# Patient Record
Sex: MALE | Race: WHITE | Employment: UNEMPLOYED | URBAN - METROPOLITAN AREA
[De-identification: names, ages, dates, MRNs, and addresses within clinical notes are randomized per-mention and may not be internally consistent; named-entity substitution may affect disease eponyms.]

---

## 2022-07-12 ENCOUNTER — APPOINTMENT (OUTPATIENT)
Dept: GENERAL RADIOLOGY | Age: 47
End: 2022-07-12
Payer: COMMERCIAL

## 2022-07-12 ENCOUNTER — HOSPITAL ENCOUNTER (OUTPATIENT)
Age: 47
Setting detail: OBSERVATION
Discharge: HOME | End: 2022-07-15
Attending: EMERGENCY MEDICINE | Admitting: INTERNAL MEDICINE
Payer: COMMERCIAL

## 2022-07-12 DIAGNOSIS — M25.461 EFFUSION, RIGHT KNEE: ICD-10-CM

## 2022-07-12 DIAGNOSIS — R60.9 PERIPHERAL EDEMA: ICD-10-CM

## 2022-07-12 DIAGNOSIS — M25.461 EFFUSION OF RIGHT KNEE: Primary | ICD-10-CM

## 2022-07-12 LAB
ALBUMIN SERPL-MCNC: 3.2 G/DL (ref 3.5–4.6)
ALP BLD-CCNC: 133 U/L (ref 35–104)
ALT SERPL-CCNC: 91 U/L (ref 0–41)
ANION GAP SERPL CALCULATED.3IONS-SCNC: 12 MEQ/L (ref 9–15)
AST SERPL-CCNC: 67 U/L (ref 0–40)
BASOPHILS ABSOLUTE: 0 K/UL (ref 0–0.1)
BASOPHILS RELATIVE PERCENT: 0.6 % (ref 0.2–1.2)
BILIRUB SERPL-MCNC: 0.6 MG/DL (ref 0.2–0.7)
BUN BLDV-MCNC: 11 MG/DL (ref 6–20)
CALCIUM SERPL-MCNC: 9.4 MG/DL (ref 8.5–9.9)
CHLORIDE BLD-SCNC: 101 MEQ/L (ref 95–107)
CO2: 22 MEQ/L (ref 20–31)
CREAT SERPL-MCNC: 0.78 MG/DL (ref 0.7–1.2)
EOSINOPHILS ABSOLUTE: 0.4 K/UL (ref 0–0.5)
EOSINOPHILS RELATIVE PERCENT: 6.1 % (ref 0.8–7)
GFR AFRICAN AMERICAN: >60
GFR NON-AFRICAN AMERICAN: >60
GLOBULIN: 6.2 G/DL (ref 2.3–3.5)
GLUCOSE BLD-MCNC: 94 MG/DL (ref 70–99)
HCT VFR BLD CALC: 40.3 % (ref 42–52)
HEMOGLOBIN: 13 G/DL (ref 13.7–17.5)
IMMATURE GRANULOCYTES #: 0 K/UL
IMMATURE GRANULOCYTES %: 0.2 %
LV EF: 60 %
LVEF MODALITY: NORMAL
LYMPHOCYTES ABSOLUTE: 2.4 K/UL (ref 1.3–3.6)
LYMPHOCYTES RELATIVE PERCENT: 37.1 %
MCH RBC QN AUTO: 30.7 PG (ref 25.7–32.2)
MCHC RBC AUTO-ENTMCNC: 32.3 % (ref 32.3–36.5)
MCV RBC AUTO: 95.3 FL (ref 79–92.2)
MONOCYTES ABSOLUTE: 0.5 K/UL (ref 0.3–0.8)
MONOCYTES RELATIVE PERCENT: 8.4 % (ref 5.3–12.2)
NEUTROPHILS ABSOLUTE: 3.1 K/UL (ref 1.8–5.4)
NEUTROPHILS RELATIVE PERCENT: 47.6 % (ref 34–67.9)
PDW BLD-RTO: 13.5 % (ref 11.6–14.4)
PLATELET # BLD: 247 K/UL (ref 163–337)
POTASSIUM SERPL-SCNC: 4 MEQ/L (ref 3.4–4.9)
PRO-BNP: 143 PG/ML
RBC # BLD: 4.23 M/UL (ref 4.63–6.08)
SODIUM BLD-SCNC: 135 MEQ/L (ref 135–144)
TOTAL PROTEIN: 9.4 G/DL (ref 6.3–8)
TSH SERPL DL<=0.05 MIU/L-ACNC: 1.91 UIU/ML (ref 0.44–3.86)
URIC ACID, SERUM: 6.6 MG/DL (ref 3.4–7)
WBC # BLD: 6.4 K/UL (ref 4.2–9)

## 2022-07-12 PROCEDURE — 2580000003 HC RX 258: Performed by: INTERNAL MEDICINE

## 2022-07-12 PROCEDURE — 99251 PR INITL INPATIENT CONSULT NEW/ESTAB PT 20 MIN: CPT | Performed by: ORTHOPAEDIC SURGERY

## 2022-07-12 PROCEDURE — 83880 ASSAY OF NATRIURETIC PEPTIDE: CPT

## 2022-07-12 PROCEDURE — 6370000000 HC RX 637 (ALT 250 FOR IP): Performed by: INTERNAL MEDICINE

## 2022-07-12 PROCEDURE — G0378 HOSPITAL OBSERVATION PER HR: HCPCS

## 2022-07-12 PROCEDURE — 85025 COMPLETE CBC W/AUTO DIFF WBC: CPT

## 2022-07-12 PROCEDURE — 6370000000 HC RX 637 (ALT 250 FOR IP): Performed by: ORTHOPAEDIC SURGERY

## 2022-07-12 PROCEDURE — 73562 X-RAY EXAM OF KNEE 3: CPT

## 2022-07-12 PROCEDURE — 84550 ASSAY OF BLOOD/URIC ACID: CPT

## 2022-07-12 PROCEDURE — 99285 EMERGENCY DEPT VISIT HI MDM: CPT

## 2022-07-12 PROCEDURE — C8929 TTE W OR WO FOL WCON,DOPPLER: HCPCS

## 2022-07-12 PROCEDURE — 96374 THER/PROPH/DIAG INJ IV PUSH: CPT

## 2022-07-12 PROCEDURE — 20610 DRAIN/INJ JOINT/BURSA W/O US: CPT

## 2022-07-12 PROCEDURE — 96375 TX/PRO/DX INJ NEW DRUG ADDON: CPT

## 2022-07-12 PROCEDURE — 80053 COMPREHEN METABOLIC PANEL: CPT

## 2022-07-12 PROCEDURE — 6360000004 HC RX CONTRAST MEDICATION: Performed by: INTERNAL MEDICINE

## 2022-07-12 PROCEDURE — 6360000002 HC RX W HCPCS: Performed by: INTERNAL MEDICINE

## 2022-07-12 PROCEDURE — 6360000002 HC RX W HCPCS: Performed by: EMERGENCY MEDICINE

## 2022-07-12 PROCEDURE — 84443 ASSAY THYROID STIM HORMONE: CPT

## 2022-07-12 PROCEDURE — 1210000000 HC MED SURG R&B

## 2022-07-12 PROCEDURE — 36415 COLL VENOUS BLD VENIPUNCTURE: CPT

## 2022-07-12 RX ORDER — SODIUM CHLORIDE 0.9 % (FLUSH) 0.9 %
5-40 SYRINGE (ML) INJECTION EVERY 12 HOURS SCHEDULED
Status: DISCONTINUED | OUTPATIENT
Start: 2022-07-12 | End: 2022-07-15 | Stop reason: HOSPADM

## 2022-07-12 RX ORDER — FUROSEMIDE 10 MG/ML
40 INJECTION INTRAMUSCULAR; INTRAVENOUS 2 TIMES DAILY
Status: DISCONTINUED | OUTPATIENT
Start: 2022-07-12 | End: 2022-07-13

## 2022-07-12 RX ORDER — COLCHICINE 0.6 MG/1
1.2 TABLET ORAL ONCE
Status: DISCONTINUED | OUTPATIENT
Start: 2022-07-12 | End: 2022-07-15 | Stop reason: HOSPADM

## 2022-07-12 RX ORDER — POLYETHYLENE GLYCOL 3350 17 G/17G
17 POWDER, FOR SOLUTION ORAL DAILY PRN
Status: DISCONTINUED | OUTPATIENT
Start: 2022-07-12 | End: 2022-07-15 | Stop reason: HOSPADM

## 2022-07-12 RX ORDER — MELOXICAM 7.5 MG/1
7.5 TABLET ORAL DAILY
Status: DISCONTINUED | OUTPATIENT
Start: 2022-07-12 | End: 2022-07-15 | Stop reason: HOSPADM

## 2022-07-12 RX ORDER — OXYCODONE HYDROCHLORIDE AND ACETAMINOPHEN 5; 325 MG/1; MG/1
1 TABLET ORAL EVERY 4 HOURS PRN
Status: DISCONTINUED | OUTPATIENT
Start: 2022-07-12 | End: 2022-07-15 | Stop reason: HOSPADM

## 2022-07-12 RX ORDER — ONDANSETRON 2 MG/ML
4 INJECTION INTRAMUSCULAR; INTRAVENOUS EVERY 6 HOURS PRN
Status: DISCONTINUED | OUTPATIENT
Start: 2022-07-12 | End: 2022-07-15 | Stop reason: HOSPADM

## 2022-07-12 RX ORDER — KETOROLAC TROMETHAMINE 15 MG/ML
15 INJECTION, SOLUTION INTRAMUSCULAR; INTRAVENOUS ONCE
Status: COMPLETED | OUTPATIENT
Start: 2022-07-12 | End: 2022-07-12

## 2022-07-12 RX ORDER — SODIUM CHLORIDE 0.9 % (FLUSH) 0.9 %
5-40 SYRINGE (ML) INJECTION PRN
Status: DISCONTINUED | OUTPATIENT
Start: 2022-07-12 | End: 2022-07-15 | Stop reason: HOSPADM

## 2022-07-12 RX ORDER — MELOXICAM 7.5 MG/1
7.5 TABLET ORAL DAILY
Qty: 30 TABLET | Refills: 5 | Status: SHIPPED | OUTPATIENT
Start: 2022-07-12

## 2022-07-12 RX ORDER — CITALOPRAM 10 MG/1
10 TABLET ORAL DAILY
COMMUNITY

## 2022-07-12 RX ORDER — BICTEGRAVIR SODIUM, EMTRICITABINE, AND TENOFOVIR ALAFENAMIDE FUMARATE 50; 200; 25 MG/1; MG/1; MG/1
1 TABLET ORAL DAILY
COMMUNITY

## 2022-07-12 RX ORDER — ENOXAPARIN SODIUM 100 MG/ML
30 INJECTION SUBCUTANEOUS 2 TIMES DAILY
Status: DISCONTINUED | OUTPATIENT
Start: 2022-07-12 | End: 2022-07-12

## 2022-07-12 RX ORDER — LORAZEPAM 0.5 MG/1
0.5 TABLET ORAL EVERY 6 HOURS PRN
COMMUNITY

## 2022-07-12 RX ORDER — ONDANSETRON 4 MG/1
4 TABLET, ORALLY DISINTEGRATING ORAL EVERY 8 HOURS PRN
Status: DISCONTINUED | OUTPATIENT
Start: 2022-07-12 | End: 2022-07-15 | Stop reason: HOSPADM

## 2022-07-12 RX ORDER — SODIUM CHLORIDE 9 MG/ML
INJECTION, SOLUTION INTRAVENOUS PRN
Status: DISCONTINUED | OUTPATIENT
Start: 2022-07-12 | End: 2022-07-15 | Stop reason: HOSPADM

## 2022-07-12 RX ORDER — ACETAMINOPHEN 650 MG/1
650 SUPPOSITORY RECTAL EVERY 6 HOURS PRN
Status: DISCONTINUED | OUTPATIENT
Start: 2022-07-12 | End: 2022-07-15 | Stop reason: HOSPADM

## 2022-07-12 RX ORDER — ACETAMINOPHEN 325 MG/1
650 TABLET ORAL EVERY 6 HOURS PRN
Status: DISCONTINUED | OUTPATIENT
Start: 2022-07-12 | End: 2022-07-15 | Stop reason: HOSPADM

## 2022-07-12 RX ADMIN — PERFLUTREN 1.65 MG: 6.52 INJECTION, SUSPENSION INTRAVENOUS at 18:08

## 2022-07-12 RX ADMIN — OXYCODONE AND ACETAMINOPHEN 1 TABLET: 325; 5 TABLET ORAL at 21:55

## 2022-07-12 RX ADMIN — MELOXICAM 7.5 MG: 7.5 TABLET ORAL at 18:54

## 2022-07-12 RX ADMIN — FUROSEMIDE 40 MG: 10 INJECTION, SOLUTION INTRAMUSCULAR; INTRAVENOUS at 17:29

## 2022-07-12 RX ADMIN — SODIUM CHLORIDE, PRESERVATIVE FREE 10 ML: 5 INJECTION INTRAVENOUS at 21:58

## 2022-07-12 RX ADMIN — KETOROLAC TROMETHAMINE 15 MG: 15 INJECTION, SOLUTION INTRAMUSCULAR; INTRAVENOUS at 10:38

## 2022-07-12 ASSESSMENT — PAIN SCALES - GENERAL
PAINLEVEL_OUTOF10: 2
PAINLEVEL_OUTOF10: 2
PAINLEVEL_OUTOF10: 7
PAINLEVEL_OUTOF10: 6
PAINLEVEL_OUTOF10: 6

## 2022-07-12 ASSESSMENT — PAIN DESCRIPTION - LOCATION
LOCATION: FOOT
LOCATION: FOOT;KNEE
LOCATION: FOOT;KNEE
LOCATION: FOOT

## 2022-07-12 ASSESSMENT — PAIN DESCRIPTION - ORIENTATION
ORIENTATION: LEFT;RIGHT
ORIENTATION: RIGHT;LEFT
ORIENTATION: RIGHT;LEFT;ANTERIOR
ORIENTATION: LEFT;RIGHT

## 2022-07-12 ASSESSMENT — PAIN DESCRIPTION - PAIN TYPE
TYPE: ACUTE PAIN

## 2022-07-12 ASSESSMENT — PAIN - FUNCTIONAL ASSESSMENT: PAIN_FUNCTIONAL_ASSESSMENT: 0-10

## 2022-07-12 ASSESSMENT — PAIN DESCRIPTION - FREQUENCY
FREQUENCY: INTERMITTENT
FREQUENCY: INTERMITTENT

## 2022-07-12 ASSESSMENT — PAIN DESCRIPTION - DESCRIPTORS
DESCRIPTORS: THROBBING
DESCRIPTORS: ACHING;TENDER
DESCRIPTORS: BURNING;THROBBING
DESCRIPTORS: ACHING

## 2022-07-12 NOTE — PLAN OF CARE
Problem: Discharge Planning  Goal: Discharge to home or other facility with appropriate resources  Outcome: Progressing  Flowsheets (Taken 7/12/2022 4260)  Discharge to home or other facility with appropriate resources:   Identify barriers to discharge with patient and caregiver   Identify discharge learning needs (meds, wound care, etc)

## 2022-07-12 NOTE — DISCHARGE INSTRUCTIONS
ORTHOPEDIC SURGERY DISCHARGE INSTRUCTIONS:    Orthopedic Consultant: Heidi Ledezma MD    Principal Diagnosis: Right knee effusion and synovitis    Activity:  -Maintain compressive wrap on right knee with 6 inch Ace bandage. Remove dressing for hygiene purposes and subsequently rewrap. -Right knee immobilizer to be worn at all times while standing or walking. May use walker or cane for assistance as necessary. Right knee immobilizer may be removed while seated or lying in bed, as well as for application of ice packs.  -No pivoting on right foot/ankle, such as for transfers from bed to chair.  -May place full weight as tolerated on right foot and ankle, while wearing the immobilizer. Medications:  -Continue colchicine as per medicine reconciliation form.  -A prescription for meloxicam (Mobic) 7.5 mg tablets has been sent to your pharmacy. Please take 1 tablet by mouth with food on a regular daily schedule. This is an anti-inflammatory medication. Additional Instructions:  -May apply ice packs to right knee for 15-20 minutes every 4 hours as needed to control inflammation/swelling. Remove knee immobilizer for purposes of icing. Follow-up:  -Will need to be seen by Dr. Heidi Ledezma in office ([address]) on 7/26 for reassessment of right knee inflammation. Please call [phone] within 2-3 days following discharge from the hospital to schedule this appointment.     Heidi Ledezma MD  Orthopedic Surgery, Specialist in Hip and Knee Reconstruction and Revision    [address]  [address]  [address], [address] [facility]

## 2022-07-12 NOTE — ED NOTES
Dr. Carey Bucio accepted patient for admission after Dr. Adan Simon agreed to C/S. [place] super aware patient reeady to be admitted upstairs.       Cindy Walker RN  07/12/22 1386

## 2022-07-12 NOTE — PROGRESS NOTES
Patient brought up to room 208 via wheelchair, pt was already in wheelchair when I went to get him from the ER. Patient used both hand rails in bathroom to transfer to commode and back to chair after toileting. CGA. Patient required help with lower dressing. Patient to bed via stand and pivot with leaning on bed to transfer self with CGA to bed. Patient did scoot self up in bed into position of comfort. Dr Rk Bazzi informed that ortho, Dr Mary Hurd, was contacted in ER and will see the patient today after clinic hours. Echo tech notified for Echo order.

## 2022-07-12 NOTE — ED TRIAGE NOTES
Patient presents to ED via EMS with reports of bilat feet swelling and discomfort over the past week and increased right knee pain despite no injury.  He lives at home with his dad and reports it is becoming difficult to ambulate

## 2022-07-12 NOTE — ED PROVIDER NOTES
[facility]"" ED  EMERGENCY DEPARTMENT ENCOUNTER      Pt Name: Lona Phoenix  MRN: [id number]  [place] 1975  Date of evaluation: 7/12/2022  Provider: Melissa Delvalle MD    [address] [place] [address]       Chief Complaint   Patient presents with    Leg Swelling     bilat feet swelling for the past week    Knee Pain     right side - denies any injury         HISTORY OF PRESENT ILLNESS   (Location/Symptom, Timing/Onset, Context/Setting, Quality, Duration, Modifying Factors, Severity)  Note limiting factors. 65-year-old male presenting with bilateral foot swelling. Symptoms have been ongoing for couple of days. History of gout. He also notes swelling of his right knee. There is no redness or warmth. He denies having any fevers. No injury noted. No chest pain or shortness of breath. Nursing Notes were reviewed. REVIEW OF SYSTEMS    (2-9 systems for level 4, 10 or more for level 5)     Review of Systems   Cardiovascular: Positive for leg swelling. Except as noted above the remainder of the review of systems was reviewed and negative. PAST MEDICAL HISTORY     Past Medical History:   Diagnosis Date    Anxiety     Gout     HIV (human immunodeficiency virus infection) ([facility] [address]          SURGICAL HISTORY     History reviewed. No pertinent surgical history. CURRENT MEDICATIONS       Current Discharge Medication List      CONTINUE these medications which have NOT CHANGED    Details   citalopram (CELEXA) 10 MG tablet Take 10 mg by mouth daily      LORazepam (ATIVAN) 0.5 MG tablet Take 0.5 mg by mouth every 6 hours as needed for Anxiety. bictegravir-emtricitab-tenofovir alafenamide (BIKTARVY) -25 MG TABS per tablet Take 1 tablet by mouth daily             ALLERGIES     Bactrim [sulfamethoxazole-trimethoprim]    FAMILY HISTORY     History reviewed. No pertinent family history.        SOCIAL HISTORY       Social History     Socioeconomic History    Marital status: Single     Spouse name: None    Number of children: None    Years of education: None    Highest education level: None   Occupational History    None   Tobacco Use    Smoking status: Never Smoker    Smokeless tobacco: Never Used   Substance and Sexual Activity    Alcohol use: Never    Drug use: Never    Sexual activity: Not Currently   Other Topics Concern    None   Social History Narrative    None     Social Determinants of Health     Financial Resource Strain:     Difficulty of Paying Living Expenses: Not on file   Food Insecurity:     Worried About Running Out of Food in the Last Year: Not on file    Sorin of Food in the Last Year: Not on file   Transportation Needs:     Lack of Transportation (Medical): Not on file    Lack of Transportation (Non-Medical):  Not on file   Physical Activity:     Days of Exercise per Week: Not on file    Minutes of Exercise per Session: Not on file   Stress:     Feeling of Stress : Not on file   Social Connections:     Frequency of Communication with Friends and Family: Not on file    Frequency of Social Gatherings with Friends and Family: Not on file    Attends Restorationist Services: Not on file    Active Member of [employer] or Organizations: Not on file    Attends Club or Organization Meetings: Not on file    Marital Status: Not on file   Intimate Partner Violence:     Fear of Current or Ex-Partner: Not on file    Emotionally Abused: Not on file    Physically Abused: Not on file    Sexually Abused: Not on file   Housing Stability:     Unable to Pay for Housing in the Last Year: Not on file    Number of [place] in the Last Year: Not on file    Unstable Housing in the Last Year: Not on file       SCREENINGS    Kalli Coma Scale  Eye Opening: Spontaneous  Best Verbal Response: Oriented  Best Motor Response: Obeys commands  [place] Coma Scale Score: 15          PHYSICAL EXAM    (up to 7 for level 4, 8 or more for level 5)     ED Triage Vitals [07/12/22 1027]   BP Temp Temp Source Heart Rate Resp SpO2 Height Weight   (!) 149/104 98 °F (36.7 °C) Oral 90 20 98 % 5' 5\" (1.651 m) 250 lb (113.4 kg)       Physical Exam  Vitals and nursing note reviewed. Constitutional:       General: He is not in acute distress. Appearance: Normal appearance. He is well-developed. He is obese. He is not ill-appearing. HENT:      Head: Normocephalic and atraumatic. Mouth/Throat:      Mouth: Mucous membranes are moist.      Pharynx: Oropharynx is clear. Eyes:      Extraocular Movements: Extraocular movements intact. Conjunctiva/sclera: Conjunctivae normal.   Cardiovascular:      Rate and Rhythm: Normal rate and regular rhythm. Pulmonary:      Effort: Pulmonary effort is normal.      Breath sounds: Normal breath sounds. Abdominal:      General: Bowel sounds are normal.      Palpations: Abdomen is soft. Tenderness: There is no abdominal tenderness. Musculoskeletal:         General: Swelling present. No deformity. Normal range of motion. Cervical back: Normal range of motion and neck supple. Comments: Swelling of b/l feet, R knee swollen; no redness or warmth, decreased ROM 2/2 pain   Skin:     General: Skin is warm and dry. Capillary Refill: Capillary refill takes less than 2 seconds. Neurological:      General: No focal deficit present. Mental Status: He is alert and oriented to person, place, and time. Mental status is at baseline. Cranial Nerves: No cranial nerve deficit. Psychiatric:         Thought Content:  Thought content normal.         DIAGNOSTIC RESULTS     EKG: All EKG's are interpreted by the Emergency Department Physician who either signs or Co-signs this chart in the absence of a cardiologist.    RADIOLOGY:   Non-plain film images such as CT, Ultrasound and MRI are read by the radiologist. Plain radiographic images are visualized and preliminarily interpreted by the emergency physician with the below findings:    Interpretation per the Radiologist below, if available at the time of this note:    XR KNEE RIGHT (3 VIEWS)   Final Result   NO BONY ABNORMALITY. LARGE EFFUSION. Naima Medeirosnathan LABS:  Labs Reviewed   COMPREHENSIVE METABOLIC PANEL - Abnormal; Notable for the following components:       Result Value    Total Protein 9.4 (*)     Albumin 3.2 (*)     Alkaline Phosphatase 133 (*)     ALT 91 (*)     AST 67 (*)     Globulin 6.2 (*)     All other components within normal limits   CBC WITH AUTO DIFFERENTIAL - Abnormal; Notable for the following components:    RBC 4.23 (*)     Hemoglobin 13.0 (*)     Hematocrit 40.3 (*)     MCV 95.3 (*)     All other components within normal limits       All other labs were within normal range or not returned as of this dictation. EMERGENCY DEPARTMENT COURSE and DIFFERENTIAL DIAGNOSIS/MDM:   Vitals:    Vitals:    07/12/22 1027   BP: (!) 149/104   Pulse: 90   Resp: 20   Temp: 98 °F (36.7 °C)   TempSrc: Oral   SpO2: 98%   Weight: 250 lb (113.4 kg)   Height: 5' 5\" (1.651 m)       MDM  Number of Diagnoses or Management Options  Effusion of right knee  Peripheral edema  Diagnosis management comments: Knee is tapped with significant fluid drained. Patient has improved range of motion afterwards. Recommended supportive care at home and leg elevation. He will need compression stockings. Attempted to discharge patient and upon putting his feet on the ground he screams out in pain. He is very dramatic. He does not attempt to even bear weight. His father is handicapped and cannot take care of him at home. I told him he should be able to take care of his self with a brace and crutches. Patient refusing to attempt this. At this point I called the hospitalist, Lucius Boeck. He accepts the admission I did speak to orthopedics at his request.  We will start on colchicine.         Arthrocentesis    Date/Time: 7/12/2022 11:31 AM  Performed by: Bret Sykes MD  Authorized by: Bret Sykes MD     Consent:     Consent obtained: Verbal    Consent given by:  Patient    Risks discussed:  Bleeding and infection    Alternatives discussed:  No treatment  Location:     Location:  Knee    Knee:  R knee  Anesthesia (see MAR for exact dosages): Anesthesia method:  Local infiltration    Local anesthetic:  Lidocaine 1% w/o epi  Procedure details:     Preparation: Patient was prepped and draped in usual sterile fashion      Needle gauge:  18 G    Ultrasound guidance: no      Approach:  Medial    Aspirate amount:  90cc    Aspirate characteristics:  Clear and yellow    Steroid injected: no      Specimen collected: no    Post-procedure details:     Dressing:  Adhesive bandage    Patient tolerance of procedure: Tolerated well, no immediate complications        CRITICAL CARE TIME   Total Critical Care time was 0 minutes, excluding separately reportable procedures. There was a high probability of clinically significant/life threatening deterioration in the patient's condition which required my urgent intervention. FINAL IMPRESSION      1. Effusion of right knee Stable   2.  Peripheral edema Stable         DISPOSITION/PLAN   DISPOSITION Decision To Discharge 07/12/2022 11:25:05 AM      (Please note that portions of this note were completed with a voice recognition program.  Efforts were made to edit the dictations but occasionally words are mis-transcribed.)    Joi Pinon MD (electronically signed)  Attending Emergency Physician        Joi Pinon MD  07/12/22 7313       Joi Pinon MD  07/12/22 1751

## 2022-07-12 NOTE — ED NOTES
Perfect serve sent to Dr. Cierra Robledo per Dr. Chris Rangel request.     John Avina RN  07/12/22 [phone]

## 2022-07-13 LAB
ANION GAP SERPL CALCULATED.3IONS-SCNC: 11 MEQ/L (ref 9–15)
BASOPHILS ABSOLUTE: 0 K/UL (ref 0–0.1)
BASOPHILS RELATIVE PERCENT: 0.7 % (ref 0.2–1.2)
BUN BLDV-MCNC: 14 MG/DL (ref 6–20)
CALCIUM SERPL-MCNC: 9.2 MG/DL (ref 8.5–9.9)
CHLORIDE BLD-SCNC: 102 MEQ/L (ref 95–107)
CO2: 25 MEQ/L (ref 20–31)
CREAT SERPL-MCNC: 0.94 MG/DL (ref 0.7–1.2)
EOSINOPHILS ABSOLUTE: 0.4 K/UL (ref 0–0.5)
EOSINOPHILS RELATIVE PERCENT: 8 % (ref 0.8–7)
GFR AFRICAN AMERICAN: >60
GFR NON-AFRICAN AMERICAN: >60
GLUCOSE BLD-MCNC: 96 MG/DL (ref 70–99)
HCT VFR BLD CALC: 37.9 % (ref 42–52)
HEMOGLOBIN: 12.2 G/DL (ref 13.7–17.5)
IMMATURE GRANULOCYTES #: 0 K/UL
IMMATURE GRANULOCYTES %: 0.2 %
LYMPHOCYTES ABSOLUTE: 1.7 K/UL (ref 1.3–3.6)
LYMPHOCYTES RELATIVE PERCENT: 31.6 %
MCH RBC QN AUTO: 30.7 PG (ref 25.7–32.2)
MCHC RBC AUTO-ENTMCNC: 32.2 % (ref 32.3–36.5)
MCV RBC AUTO: 95.2 FL (ref 79–92.2)
MONOCYTES ABSOLUTE: 0.6 K/UL (ref 0.3–0.8)
MONOCYTES RELATIVE PERCENT: 10.8 % (ref 5.3–12.2)
NEUTROPHILS ABSOLUTE: 2.7 K/UL (ref 1.8–5.4)
NEUTROPHILS RELATIVE PERCENT: 48.7 % (ref 34–67.9)
PDW BLD-RTO: 13.4 % (ref 11.6–14.4)
PLATELET # BLD: 224 K/UL (ref 163–337)
POTASSIUM REFLEX MAGNESIUM: 4.1 MEQ/L (ref 3.4–4.9)
PRO-BNP: 165 PG/ML
RBC # BLD: 3.98 M/UL (ref 4.63–6.08)
SODIUM BLD-SCNC: 138 MEQ/L (ref 135–144)
WBC # BLD: 5.5 K/UL (ref 4.2–9)

## 2022-07-13 PROCEDURE — 1210000000 HC MED SURG R&B

## 2022-07-13 PROCEDURE — 97116 GAIT TRAINING THERAPY: CPT

## 2022-07-13 PROCEDURE — 85025 COMPLETE CBC W/AUTO DIFF WBC: CPT

## 2022-07-13 PROCEDURE — 36415 COLL VENOUS BLD VENIPUNCTURE: CPT

## 2022-07-13 PROCEDURE — 6370000000 HC RX 637 (ALT 250 FOR IP): Performed by: ORTHOPAEDIC SURGERY

## 2022-07-13 PROCEDURE — 80048 BASIC METABOLIC PNL TOTAL CA: CPT

## 2022-07-13 PROCEDURE — 97162 PT EVAL MOD COMPLEX 30 MIN: CPT

## 2022-07-13 PROCEDURE — 2580000003 HC RX 258: Performed by: INTERNAL MEDICINE

## 2022-07-13 PROCEDURE — 97530 THERAPEUTIC ACTIVITIES: CPT

## 2022-07-13 PROCEDURE — 6370000000 HC RX 637 (ALT 250 FOR IP): Performed by: INTERNAL MEDICINE

## 2022-07-13 PROCEDURE — 83880 ASSAY OF NATRIURETIC PEPTIDE: CPT

## 2022-07-13 PROCEDURE — G0378 HOSPITAL OBSERVATION PER HR: HCPCS

## 2022-07-13 RX ORDER — PREDNISONE 20 MG/1
20 TABLET ORAL 2 TIMES DAILY
Status: DISCONTINUED | OUTPATIENT
Start: 2022-07-13 | End: 2022-07-15 | Stop reason: HOSPADM

## 2022-07-13 RX ORDER — COLCHICINE 0.6 MG/1
0.6 TABLET ORAL 2 TIMES DAILY
Status: DISCONTINUED | OUTPATIENT
Start: 2022-07-13 | End: 2022-07-15 | Stop reason: HOSPADM

## 2022-07-13 RX ORDER — FUROSEMIDE 20 MG/1
20 TABLET ORAL DAILY
Status: DISCONTINUED | OUTPATIENT
Start: 2022-07-13 | End: 2022-07-15 | Stop reason: HOSPADM

## 2022-07-13 RX ORDER — LORAZEPAM 0.5 MG/1
0.5 TABLET ORAL EVERY 6 HOURS PRN
Status: DISCONTINUED | OUTPATIENT
Start: 2022-07-13 | End: 2022-07-15 | Stop reason: HOSPADM

## 2022-07-13 RX ORDER — CITALOPRAM 20 MG/1
10 TABLET ORAL DAILY
Status: DISCONTINUED | OUTPATIENT
Start: 2022-07-13 | End: 2022-07-15 | Stop reason: HOSPADM

## 2022-07-13 RX ORDER — GABAPENTIN 100 MG/1
100 CAPSULE ORAL 3 TIMES DAILY
Status: DISCONTINUED | OUTPATIENT
Start: 2022-07-13 | End: 2022-07-15 | Stop reason: HOSPADM

## 2022-07-13 RX ADMIN — PREDNISONE 20 MG: 20 TABLET ORAL at 21:02

## 2022-07-13 RX ADMIN — GABAPENTIN 100 MG: 100 CAPSULE ORAL at 21:01

## 2022-07-13 RX ADMIN — COLCHICINE 0.6 MG: 0.6 TABLET ORAL at 08:13

## 2022-07-13 RX ADMIN — OXYCODONE AND ACETAMINOPHEN 1 TABLET: 325; 5 TABLET ORAL at 12:59

## 2022-07-13 RX ADMIN — MELOXICAM 7.5 MG: 7.5 TABLET ORAL at 08:08

## 2022-07-13 RX ADMIN — SODIUM CHLORIDE, PRESERVATIVE FREE 10 ML: 5 INJECTION INTRAVENOUS at 21:04

## 2022-07-13 RX ADMIN — OXYCODONE AND ACETAMINOPHEN 1 TABLET: 325; 5 TABLET ORAL at 08:08

## 2022-07-13 RX ADMIN — CITALOPRAM HYDROBROMIDE 10 MG: 20 TABLET ORAL at 08:15

## 2022-07-13 RX ADMIN — SODIUM CHLORIDE, PRESERVATIVE FREE 10 ML: 5 INJECTION INTRAVENOUS at 08:15

## 2022-07-13 RX ADMIN — FUROSEMIDE 20 MG: 20 TABLET ORAL at 08:14

## 2022-07-13 RX ADMIN — PREDNISONE 20 MG: 20 TABLET ORAL at 08:18

## 2022-07-13 RX ADMIN — GABAPENTIN 100 MG: 100 CAPSULE ORAL at 08:14

## 2022-07-13 RX ADMIN — COLCHICINE 0.6 MG: 0.6 TABLET ORAL at 21:01

## 2022-07-13 RX ADMIN — GABAPENTIN 100 MG: 100 CAPSULE ORAL at 12:59

## 2022-07-13 ASSESSMENT — PAIN DESCRIPTION - LOCATION
LOCATION: GENERALIZED;KNEE
LOCATION: LEG
LOCATION: GENERALIZED
LOCATION: KNEE;GENERALIZED

## 2022-07-13 ASSESSMENT — PAIN DESCRIPTION - ORIENTATION
ORIENTATION: RIGHT;LEFT
ORIENTATION: RIGHT
ORIENTATION: LEFT

## 2022-07-13 ASSESSMENT — PAIN SCALES - GENERAL
PAINLEVEL_OUTOF10: 2
PAINLEVEL_OUTOF10: 2
PAINLEVEL_OUTOF10: 7
PAINLEVEL_OUTOF10: 7
PAINLEVEL_OUTOF10: 4
PAINLEVEL_OUTOF10: 2
PAINLEVEL_OUTOF10: 3
PAINLEVEL_OUTOF10: 7

## 2022-07-13 ASSESSMENT — PAIN DESCRIPTION - DESCRIPTORS: DESCRIPTORS: ACHING

## 2022-07-13 ASSESSMENT — PAIN DESCRIPTION - FREQUENCY: FREQUENCY: INTERMITTENT

## 2022-07-13 ASSESSMENT — PAIN DESCRIPTION - PAIN TYPE: TYPE: ACUTE PAIN

## 2022-07-13 ASSESSMENT — PAIN DESCRIPTION - ONSET: ONSET: ON-GOING

## 2022-07-13 ASSESSMENT — PAIN - FUNCTIONAL ASSESSMENT: PAIN_FUNCTIONAL_ASSESSMENT: ACTIVITIES ARE NOT PREVENTED

## 2022-07-13 NOTE — H&P
Hospital Medicine History & Physical      PCP: Romana Menchaca    Date of Admission: 7/12/2022    Date of Service: 7/13/22      Chief Complaint:  Legs edema, feet/knee pain      History Of Present Illness:  49 y.o. male who presented to [facility] with above complains. About 2 weeks ago he noted L knee edema, L ankle and great toe pain, than edema/pain appeared in R knee/R ankle. His mobility markedly decreased due to intractable pain/edema. At this point he came to ER for further evaluation. Denied fever, trauma, dyspnea. In ER R nee aspiration performed and 90CC clear fluid were removed. After initial stabilization patient was admitted for further management to the floor. Past Medical History:          Diagnosis Date    Anxiety     Gout     HIV (human immunodeficiency virus infection) ([address]        Past Surgical History:      History reviewed. No pertinent surgical history. Medications Prior to Admission:      Prior to Admission medications    Medication Sig Start Date End Date Taking? Authorizing Provider   citalopram (CELEXA) 10 MG tablet Take 10 mg by mouth daily   Yes Historical Provider, MD   LORazepam (ATIVAN) 0.5 MG tablet Take 0.5 mg by mouth every 6 hours as needed for Anxiety. Yes Historical Provider, MD   bictegravir-emtricitab-tenofovir alafenamide (BIKTARVY) -25 MG TABS per tablet Take 1 tablet by mouth daily   Yes Historical Provider, MD   meloxicam (MOBIC) 7.5 MG tablet Take 1 tablet by mouth daily Regular daily schedule. 7/12/22  Yes Deo Melendrez MD       Allergies:  Bactrim [sulfamethoxazole-trimethoprim]    Social History:      The patient currently lives home    TOBACCO:   reports that he has never smoked. He has never used smokeless tobacco.  ETOH:   reports no history of alcohol use. Family History:       Reviewed in detail and negative for DM, CAD, Cancer, CVA. Positive as follows:    History reviewed. No pertinent family history.     REVIEW OF SYSTEMS: Pertinent positives as noted in the HPI. All other systems reviewed and negative. PHYSICAL EXAM:    /60   Pulse 81   Temp 97.9 °F (36.6 °C) (Oral)   Resp 20   Ht 5' 5\" (1.651 m)   Wt 254 lb (115.2 kg)   SpO2 98%   BMI 42.27 kg/m²     General appearance:  No apparent distress, appears stated age and cooperative. HEENT:  Normal cephalic, atraumatic without obvious deformity. Pupils equal, round, and reactive to light. Extra ocular muscles intact. Conjunctivae/corneas clear. Neck: Supple, with full range of motion. No jugular venous distention. Trachea midline. Respiratory:  Normal respiratory effort. Clear to auscultation, bilaterally without Rales/Wheezes/Rhonchi. Cardiovascular:  Regular rate and rhythm with normal S1/S2 without murmurs, rubs or gallops. Abdomen: Soft, non-tender, non-distended with normal bowel sounds. Musculoskeletal:  R lower leg/R knee edema. .  Full range of motion without deformity. Skin: Skin color, texture, turgor normal.  No rashes or lesions. Neurologic:  Neurovascularly intact without any focal sensory/motor deficits. Cranial nerves: II-XII intact, grossly non-focal.  Psychiatric:  Alert and oriented, thought content appropriate, normal insight  Capillary Refill: Brisk,< 3 seconds   Peripheral Pulses: +2 palpable, equal bilaterally       Labs:     Recent Labs     07/12/22  1040 07/13/22  0533   WBC 6.4 5.5   HGB 13.0* 12.2*   HCT 40.3* 37.9*    224     Recent Labs     07/12/22  1040 07/13/22  0532    138   K 4.0 4.1    102   CO2 22 25   BUN 11 14   CREATININE 0.78 0.94   CALCIUM 9.4 9.2     Recent Labs     07/12/22  1040   AST 67*   ALT 91*   BILITOT 0.6   ALKPHOS 133*     No results for input(s): INR in the last 72 hours. No results for input(s): Keshia Fuchs in the last 72 hours.     Urinalysis:    No results found for: Nelly Merl, BACTERIA, RBCUA, BLOODU, [address], [address]    Radiology:     CXR: I have reviewed the CXR with the following interpretation:   EKG:  I have reviewed the EKG with the following interpretation:     XR KNEE RIGHT (3 VIEWS)   Final Result   NO BONY ABNORMALITY. LARGE EFFUSION. Josselin Flynn ASSESSMENT:    Active Hospital Problems    Diagnosis Date Noted    Edema [R60.9] 07/12/2022     Priority: Medium    Effusion, right knee [M25.461] 07/12/2022     Priority: Medium       PLAN:        DVT Prophylaxis:   Diet: ADULT DIET; Regular  Code Status: Full Code    PT/OT Eval Status:     Dispo - R lower leg/knee edema, knee effusion- post aspiration, appreciate ortho recommendations- treatment for gout initiated, continue with lasix as well  Obesity with BMI 42%- supportive care  HIV positive- continue with Bictarvy -following by ID service as outpatient  Medically stable for acute admission at [facility], will follow up along with consultants        Ed Johansen MD    Thank you Rita Bailey for the opportunity to be involved in this patient's care. If you have any questions or concerns please feel free to contact me.

## 2022-07-13 NOTE — PROGRESS NOTES
Physical Therapy  Facility/Department: [facility] MED SURG UNIT  Physical Therapy Initial Assessment - Med Surg     Name: Mikey Fontaine  : 1975  MRN: [id number]  Date of Service: 2022    Discharge Recommendations:  Subacute/Skilled Nursing Facility,Home with Home health PT          Patient Diagnosis(es): The primary encounter diagnosis was Effusion of right knee. A diagnosis of Peripheral edema was also pertinent to this visit. Past Medical History:  has a past medical history of Anxiety, Gout, and HIV (human immunodeficiency virus infection) ([address] Past Surgical History:  has no past surgical history on file. Assessment   Body Structures, Functions, Activity Limitations Requiring Skilled Therapeutic Intervention: Decreased functional mobility ; Decreased ROM; Decreased strength;Decreased endurance;Decreased balance; Increased pain  Assessment: Pt ia a 48 yo male who reports having increased right knee pain and swelling and also increased right ankle pain. Pt was brougth to ER and had 90 cc drained from his right knee. PT completed evaluation with pt ambulated with an antalgic gait pattern with reports of 10/10 R LE pain with Wbing and no reports of pain while at rest. Pt can benefit from continued PT to work on increase his mobility and gait. Pt lives at home with is dad, who has WC bound for pts lifetime. PT recommend SHELLY vs home with [facility] pt so that pt is safe to return home. Treatment Diagnosis: LE edema; increased difficulty with gait  Therapy Prognosis: Good  Decision Making: Medium Complexity  Requires PT Follow-Up: Yes  Activity Tolerance  Activity Tolerance: Patient limited by endurance; Patient limited by pain     Plan   Plan  Plan: 5-7 times per week (1-2)  Plan weeks: Recommend SHELLY vs home with [facility] PT  Current Treatment Recommendations: Strengthening,ROM,Balance training,Functional mobility training,Transfer training,Gait training,Endurance training,Manual Therapy - Soft Tissue Mobilization,Pain management,Safety education & training,Home exercise program,Modalities  Plan Comment: CP     Restrictions  Restrictions/Precautions  Restrictions/Precautions: Weight Bearing (WBAT)  Required Braces or Orthoses?: Yes (Right knee immobilizer when OOB)  Lower Extremity Weight Bearing Restrictions  Right Lower Extremity Weight Bearing: Weight Bearing As Tolerated (No Pivoting on R LE with transfers)  Left Lower Extremity Weight Bearing: Weight Bearing As Tolerated     Subjective   General  Chart Reviewed: Yes  Patient assessed for rehabilitation services?: Yes  Additional Pertinent Hx: Pt arrived to hospital with c/o of LE swelling; P has 90 cc removed from right knee. Family / Caregiver Present: No  Referring Practitioner: Kylee Shearer  Referral Date : 07/13/22  Diagnosis: B LE edema  Subjective  Subjective: Pt reports no right leg pain at rest but 10/10 pain with Wbing.          Social/Functional History  Social/Functional History  Lives With: Family (Pts dad is WC bound his whole life due to AKA while in [facility])  Type of Home: House  Home Layout: One level  Home Access: Ramped entrance  Bathroom Shower/Tub: Walk-in shower  Bathroom Toilet: Standard  Bathroom Equipment: Hand-held shower,Tub transfer bench  Bathroom Accessibility: Wheelchair accessible (Other bathroom is WC assessible)  Home Equipment: Rollator  Has the patient had two or more falls in the past year or any fall with injury in the past year?: No  Receives Help From: Family  ADL Assistance: [address] [place] [address] Avenue: Independent  Homemaking Responsibilities: Yes  Ambulation Assistance: Independent  Transfer Assistance: Independent  Active : No  Mode of Transportation: Truck  Occupation: Unemployed  Leisure & Hobbies: Watch TV, Dance  Additional Comments: Pts father is WC bound pts whole life due to [facility] injury; Leg AKA; Pt has a cat, 2 small dogs at home  Vision/Hearing           AROM RLE (degrees)  RLE General AROM: Right hip WNL, Right knee full extension with approx 40 degrees AROM while in sitting; limited right ankle (not able to move to neutral)  AROM LLE (degrees)  LLE AROM : WFL  AROM RUE (degrees)  RUE AROM : WNL  AROM LUE (degrees)  LUE AROM : WNL  Strength RLE  Comment: R knee and ankle grossly 3-/5  Strength LLE  Comment: Grossly 4/5  Strength RUE  Comment: Grossly 4/5 to 4+/5  Strength LUE  Comment: Grossly 4/5 to 4+/5              Transfers  Sit to Stand: Minimal Assistance; Moderate Assistance  Stand to sit: Minimal Assistance  Ambulation  WB Status: WBAT R LE with immobilizer donned when OOB  Ambulation  Surface: level tile  Device: Rolling Walker  Other Apparatus: Knee Immobilizer;Right  Assistance: Contact guard assistance;Minimal assistance  Quality of Gait: Pt ambulated WBAT R LE with right knee immobilizer donned needing cues to ambulated with a step to gait pattern needing cues to advance walker out further in front of him with CGA/Min A.  Gait Deviations: Slow Faith;Decreased step length  Distance: 25'x 1  Comments: Pt was washing up in a chair at the sink when PT arrived        Exercise Treatment: AP, QS and GS x 10 reps for BLE                 Goals  Short Term Goals  Time Frame for Short term goals: 1-4 days of MS  Short term goal 1: Supervision with bed mobility  Short term goal 2: CGA with transfers  Short term goal 3: Pt able to ambulate with AD with CGA for 75'x 2 so that pt can ambulate throughout him home safely  Short term goal 4: Pt able to be on his feet for 5 min or > when standing at the sink to wash up  Short term goal 5: Pt indep with HEP for B LEs to improve pts mobility  Long Term Goals  Time Frame for Long term goals : Same as STGS  Patient Goals   Patient goals :  To be able to return home safely       Education - Elevating his leg and performing APs to decrease swelling and pain          Therapy Time   Individual Concurrent Group Co-treatment   Time In  1:15pm          Time Out  2:30pm         Minutes  [address], [id number]

## 2022-07-14 LAB
ANION GAP SERPL CALCULATED.3IONS-SCNC: 8 MEQ/L (ref 9–15)
BASOPHILS ABSOLUTE: 0 K/UL (ref 0–0.1)
BASOPHILS RELATIVE PERCENT: 0.1 % (ref 0.2–1.2)
BUN BLDV-MCNC: 15 MG/DL (ref 6–20)
CALCIUM SERPL-MCNC: 9.1 MG/DL (ref 8.5–9.9)
CHLORIDE BLD-SCNC: 103 MEQ/L (ref 95–107)
CO2: 26 MEQ/L (ref 20–31)
CREAT SERPL-MCNC: 0.67 MG/DL (ref 0.7–1.2)
EOSINOPHILS ABSOLUTE: 0 K/UL (ref 0–0.5)
EOSINOPHILS RELATIVE PERCENT: 0.1 % (ref 0.8–7)
GFR AFRICAN AMERICAN: >60
GFR NON-AFRICAN AMERICAN: >60
GLUCOSE BLD-MCNC: 164 MG/DL (ref 70–99)
HCT VFR BLD CALC: 37.2 % (ref 42–52)
HEMOGLOBIN: 12 G/DL (ref 13.7–17.5)
IMMATURE GRANULOCYTES #: 0 K/UL
IMMATURE GRANULOCYTES %: 0.4 %
LYMPHOCYTES ABSOLUTE: 1.6 K/UL (ref 1.3–3.6)
LYMPHOCYTES RELATIVE PERCENT: 22.7 %
MCH RBC QN AUTO: 30.5 PG (ref 25.7–32.2)
MCHC RBC AUTO-ENTMCNC: 32.3 % (ref 32.3–36.5)
MCV RBC AUTO: 94.4 FL (ref 79–92.2)
MONOCYTES ABSOLUTE: 0.4 K/UL (ref 0.3–0.8)
MONOCYTES RELATIVE PERCENT: 5.4 % (ref 5.3–12.2)
NEUTROPHILS ABSOLUTE: 5 K/UL (ref 1.8–5.4)
NEUTROPHILS RELATIVE PERCENT: 71.3 % (ref 34–67.9)
PDW BLD-RTO: 13.2 % (ref 11.6–14.4)
PLATELET # BLD: 245 K/UL (ref 163–337)
POTASSIUM SERPL-SCNC: 4.6 MEQ/L (ref 3.4–4.9)
RBC # BLD: 3.94 M/UL (ref 4.63–6.08)
SODIUM BLD-SCNC: 137 MEQ/L (ref 135–144)
WBC # BLD: 7 K/UL (ref 4.2–9)

## 2022-07-14 PROCEDURE — 97110 THERAPEUTIC EXERCISES: CPT

## 2022-07-14 PROCEDURE — G0378 HOSPITAL OBSERVATION PER HR: HCPCS

## 2022-07-14 PROCEDURE — 6370000000 HC RX 637 (ALT 250 FOR IP): Performed by: INTERNAL MEDICINE

## 2022-07-14 PROCEDURE — 85025 COMPLETE CBC W/AUTO DIFF WBC: CPT

## 2022-07-14 PROCEDURE — 6370000000 HC RX 637 (ALT 250 FOR IP): Performed by: ORTHOPAEDIC SURGERY

## 2022-07-14 PROCEDURE — 80048 BASIC METABOLIC PNL TOTAL CA: CPT

## 2022-07-14 PROCEDURE — 2580000003 HC RX 258: Performed by: INTERNAL MEDICINE

## 2022-07-14 PROCEDURE — 97166 OT EVAL MOD COMPLEX 45 MIN: CPT

## 2022-07-14 PROCEDURE — 97530 THERAPEUTIC ACTIVITIES: CPT

## 2022-07-14 PROCEDURE — 97116 GAIT TRAINING THERAPY: CPT

## 2022-07-14 PROCEDURE — 36415 COLL VENOUS BLD VENIPUNCTURE: CPT

## 2022-07-14 RX ADMIN — SODIUM CHLORIDE, PRESERVATIVE FREE 10 ML: 5 INJECTION INTRAVENOUS at 20:19

## 2022-07-14 RX ADMIN — PREDNISONE 20 MG: 20 TABLET ORAL at 20:18

## 2022-07-14 RX ADMIN — GABAPENTIN 100 MG: 100 CAPSULE ORAL at 09:29

## 2022-07-14 RX ADMIN — PREDNISONE 20 MG: 20 TABLET ORAL at 09:28

## 2022-07-14 RX ADMIN — GABAPENTIN 100 MG: 100 CAPSULE ORAL at 20:18

## 2022-07-14 RX ADMIN — FUROSEMIDE 20 MG: 20 TABLET ORAL at 09:28

## 2022-07-14 RX ADMIN — COLCHICINE 0.6 MG: 0.6 TABLET ORAL at 20:19

## 2022-07-14 RX ADMIN — MELOXICAM 7.5 MG: 7.5 TABLET ORAL at 09:29

## 2022-07-14 RX ADMIN — CITALOPRAM HYDROBROMIDE 10 MG: 20 TABLET ORAL at 09:29

## 2022-07-14 RX ADMIN — COLCHICINE 0.6 MG: 0.6 TABLET ORAL at 09:29

## 2022-07-14 RX ADMIN — GABAPENTIN 100 MG: 100 CAPSULE ORAL at 13:24

## 2022-07-14 ASSESSMENT — PAIN DESCRIPTION - LOCATION: LOCATION: FOOT

## 2022-07-14 ASSESSMENT — PAIN SCALES - GENERAL: PAINLEVEL_OUTOF10: 1

## 2022-07-14 ASSESSMENT — PAIN DESCRIPTION - DESCRIPTORS: DESCRIPTORS: ACHING

## 2022-07-14 ASSESSMENT — PAIN DESCRIPTION - ONSET: ONSET: ON-GOING

## 2022-07-14 ASSESSMENT — PAIN - FUNCTIONAL ASSESSMENT: PAIN_FUNCTIONAL_ASSESSMENT: PREVENTS OR INTERFERES SOME ACTIVE ACTIVITIES AND ADLS

## 2022-07-14 ASSESSMENT — PAIN DESCRIPTION - ORIENTATION: ORIENTATION: RIGHT;LEFT

## 2022-07-14 ASSESSMENT — PAIN DESCRIPTION - PAIN TYPE: TYPE: ACUTE PAIN

## 2022-07-14 NOTE — PROGRESS NOTES
Facility/Department: [facility] MED SURG UNIT  Daily Treatment Note  NAME: Fadi Rodriges  : 1975  MRN: [id number]    Date of Service: 2022    Discharge Recommendations:  Subacute/Skilled Nursing Facility,Home with Home health PT        Patient Diagnosis(es): The primary encounter diagnosis was Effusion of right knee. A diagnosis of Peripheral edema was also pertinent to this visit. Assessment   Assessment: Pt. with less pain overall 1/10 at rest and 5/10 with gait. Able to ambulate inc distance via [employer] and CGA . Occ cues initally for sequencing. Pt. able to then complete seated BLE therex with no inc in pain. Pain 1/10 post session. CP applied to R knee to assist with muscle recovery and pain releif. Pt. up in recliner post.  Has Rollator at home using x 1 week as knees started to get sore. Activity Tolerance: Patient tolerated treatment well     Plan    Plan  Plan: 5-7 times per week (1-2)  Plan weeks: Recommend SHELLY vs home with [facility]  Current Treatment Recommendations: Strengthening;ROM;Balance training;Functional mobility training;Transfer training;Gait training; Endurance training;Manual Therapy - Soft Tissue Mobilization;Pain management; Safety education & training;Home exercise program;Modalities  Plan Comment: CP     Restrictions  Restrictions/Precautions  Restrictions/Precautions: Weight Bearing (WBAT)  Required Braces or Orthoses?: Yes (Right knee immobilizer when OOB)  Lower Extremity Weight Bearing Restrictions  Right Lower Extremity Weight Bearing: Weight Bearing As Tolerated (No Pivoting on R LE with transfers)  Left Lower Extremity Weight Bearing: Weight Bearing As Tolerated     Subjective    Subjective  Subjective: Pt. states his R knee is still feeling tender but much better. Pt. states 1/10 pain.   Sitting EOB upon arrival with RLE immobilizer donned finishing breakfast.   Pain: 1/10      Objective   Vitals     Transfer Training  Transfer Training: Yes  Sit to Stand: Supervision  Stand to Sit: Supervision  Stand Pivot Transfers: Supervision  Bed to Chair: Supervision  Gait Training  Gait Training: Yes  Right Side Weight Bearing: As tolerated  Gait  Overall Level of Assistance: Stand-by assistance;Contact-guard assistance  Interventions: Verbal cues  Base of Support: Widened  Speed/Faith: Slow  Step Length: Left shortened;Right shortened  Swing Pattern: Left asymmetrical  Stance: Right decreased  Gait Abnormalities: Step to gait  Distance (ft): 45 Feet  Assistive Device: Brace/splint; Walker (RLE immobilizer )     PT Exercises  Exercise Treatment: AP x 20, LAQ x 20 hold 5 sec slow release, HS curl x 20 hold 3 sec, Hip ABD seated RTB x 20 hold 5 sec, Hip Adduction seated pillow squeeze x 20 hold 5 sec, Seated march x 20 hold 3 sec              Goals  Short Term Goals  Time Frame for Short term goals: 1-4 days of MS  Short term goal 1: Supervision with bed mobility  Short term goal 2: CGA with transfers  Short term goal 3: Pt able to ambulate with AD with CGA for 75'x 2 so that pt can ambulate throughout him home safely  Short term goal 4: Pt able to be on his feet for 5 min or > when standing at the sink to wash up  Short term goal 5: Pt indep with HEP for B LEs to improve pts mobility  Long Term Goals  Time Frame for Long term goals : Same as STGS  Patient Goals   Patient goals :  To be able to return home safely    Education       Therapy Time   Individual Concurrent Group Co-treatment   Time In 0830         Time Out 0930         Minutes [address] [place] [address]         Physical Therapy

## 2022-07-14 NOTE — PROGRESS NOTES
Occupational Therapy  Facility/Department: [id number] [facility]  Occupational Therapy Initial Assessment    Name: Jack Abdul  : 1975  MRN: [id number]  Date of Service: 2022    Discharge Recommendations:  Pt. Requests rehabilitation at [facility]      Patient Diagnosis(es): The primary encounter diagnosis was Effusion of right knee. A diagnosis of Peripheral edema was also pertinent to this visit. Past Medical History:  has a past medical history of Anxiety, Gout, and HIV (human immunodeficiency virus infection) ([address] Past Surgical History:  has no past surgical history on file. Treatment Diagnosis: lack of coordination      Assessment   Performance deficits / Impairments: Decreased functional mobility ; Decreased safe awareness;Decreased balance;Decreased coordination;Decreased ADL status; Decreased endurance  Assessment: 49year old male admitted to [facility] due to R knee effusion with reduced ability to walk and care for self.   OT to address maximizing safety and independence with self care routine to return to [facility]  Treatment Diagnosis: lack of coordination  REQUIRES OT FOLLOW-UP: Yes        Plan   Plan  Times per Week: 3-7x/wk  Plan Weeks: 4 weeks  Current Treatment Recommendations: Strengthening,ROM,Balance training,Functional mobility training,Endurance training,Neuromuscular re-education,Safety education & training,Patient/Caregiver education & training,Self-Care / ADL     Restrictions  Restrictions/Precautions  Restrictions/Precautions: Weight Bearing (WBAT)  Required Braces or Orthoses?: Yes (Right knee immobilizer when OOB)  Lower Extremity Weight Bearing Restrictions  Right Lower Extremity Weight Bearing: Weight Bearing As Tolerated (No Pivoting on R LE with transfers)  Left Lower Extremity Weight Bearing: Weight Bearing As Tolerated  Required Braces or Orthoses  Right Lower Extremity Brace: Knee Brace    Subjective   General  Chart Reviewed: Yes  Patient assessed for rehabilitation services?: Yes  Diagnosis: R knee effusion     Social/Functional History  Social/Functional History  Lives With: Parent (Pts dad is WC bound his whole life due to AKA while in [employer])  Type of Home: House  Home Layout: One level  Home Access: Ramped entrance  Bathroom Shower/Tub: Walk-in shower  Bathroom Toilet: Standard  Bathroom Equipment: Hand-held shower,Tub transfer bench  Bathroom Accessibility: Wheelchair accessible (Other bathroom is WC assessible)  Home Equipment: [employer]  Has the patient had two or more falls in the past year or any fall with injury in the past year?: No  Receives Help From: Family  ADL Assistance: [place]: Independent  Homemaking Responsibilities: Yes  Ambulation Assistance: Independent  Transfer Assistance: Independent  Active : No  Patient's  Info: Dad drives  Mode of Transportation: Truck  Occupation: Unemployed  Leisure & Hobbies: Watch TV, Dance, Family, Pets  IADL Comments: Indep cooking, cleaning, laundry  Additional Comments: Pts father is WC bound pts whole life due to [employer] injury; Leg AKA; Pt has a cat, 2 small dogs at home       Objective      Transfer Training  Sit to Stand: Stand-by assistanceCGA  Stand to Sit: Stand-by assistance/CGA  Overall Level of Assistance: Stand-by assistance;Contact-guard assistance  Interventions: Verbal cues (VC's to encourage towards heel strinke to toe off as ashtyn. )  Assistive Device: Brace/splint; Walker        ADL  Feeding: Independent  Grooming: Setup  UE Bathing: Setup  LE Bathing: Minimal assistance  UE Dressing: Setup  LE Dressing: Minimal assistance  Toileting: Minimal assistance  Additional Comments: CGA transfers and functional mobility with FWW.        Orientation  Overall Orientation Status: Within Functional Limits  Orientation Level: Oriented to person;Oriented to situation;Oriented to time      LUE AROM (degrees)  LUE AROM : WFL  RUE AROM (degrees)  RUE AROM : WFL  LUE Strength  LUE Strength Comment: 4/5  RUE Strength  RUE Strength Comment: 4/5           Goals  Short Term Goals  Short Term Goal 1: Superv bathing/dressing routine with good safety  Short Term Goal 2: superv toileting with good balance  Short Term Goal 3: mod I BUE HEP for improved strength and activity tolerance for self care routine  Short Term Goal 4: superv stand x 7 minutes to compelte self care routine  Short Term Goal 5: superv item retrieval and transport for self care routine       Therapy Time   Individual Concurrent Group Co-treatment   Time In 1620         Time Out 1650         [facility] [address]

## 2022-07-14 NOTE — PROGRESS NOTES
Hospitalist Progress Note      PCP: Fifi Ford    Date of Admission: 7/12/2022    Chief Complaint: weakness/pain     Subjective: pt awake/alert     Medications:  Reviewed    Infusion Medications    sodium chloride       Scheduled Medications    bictegravir-emtricitab-tenofovir alafenamide  1 tablet Oral Daily    citalopram  10 mg Oral Daily    colchicine  0.6 mg Oral BID    predniSONE  20 mg Oral BID    furosemide  20 mg Oral Daily    gabapentin  100 mg Oral TID    colchicine  1.2 mg Oral Once    sodium chloride flush  5-40 mL IntraVENous 2 times per day    meloxicam  7.5 mg Oral Daily     PRN Meds: LORazepam, sodium chloride flush, sodium chloride, ondansetron **OR** ondansetron, polyethylene glycol, acetaminophen **OR** acetaminophen, oxyCODONE-acetaminophen      Intake/Output Summary (Last 24 hours) at 7/14/2022 0750  Last data filed at 7/14/2022 0616  Gross per 24 hour   Intake 620 ml   Output 1600 ml   Net -980 ml       Exam:    /68   Pulse 68   Temp 97.5 °F (36.4 °C) (Oral)   Resp 18   Ht 5' 5\" (1.651 m)   Wt 254 lb (115.2 kg)   SpO2 96%   BMI 42.27 kg/m²     General appearance: No apparent distress, appears stated age and cooperative. HEENT: Pupils equal, round, and reactive to light. Conjunctivae/corneas clear. Neck: Supple, with full range of motion. No jugular venous distention. Trachea midline. Respiratory:  Normal respiratory effort. Clear to auscultation, bilaterally without Rales/Wheezes/Rhonchi. Cardiovascular: Regular rate and rhythm with normal S1/S2 without murmurs, rubs or gallops. Abdomen: Soft, non-tender, non-distended with normal bowel sounds. Musculoskeletal:   edema bilaterally. Full range of motion without deformity. Skin: Skin color, texture, turgor normal.  No rashes or lesions. Neurologic:  Neurovascularly intact without any focal sensory/motor deficits.  Cranial nerves: II-XII intact, grossly non-focal.  Psychiatric: Alert and oriented, thought content appropriate, normal insight  Capillary Refill: Brisk,< 3 seconds   Peripheral Pulses: +2 palpable, equal bilaterally       Labs:   Recent Labs     07/12/22  1040 07/13/22  0533 07/14/22  0606   WBC 6.4 5.5 7.0   HGB 13.0* 12.2* 12.0*   HCT 40.3* 37.9* 37.2*    224 245     Recent Labs     07/12/22  1040 07/13/22  0532 07/14/22  0606    138 137   K 4.0 4.1 4.6    102 103   CO2 22 25 26   BUN 11 14 15   CREATININE 0.78 0.94 0.67*   CALCIUM 9.4 9.2 9.1     Recent Labs     07/12/22  1040   AST 67*   ALT 91*   BILITOT 0.6   ALKPHOS 133*     No results for input(s): INR in the last 72 hours. No results for input(s): Kasie Dross in the last 72 hours. Urinalysis:    No results found for: Gisel Alexandra, BACTERIA, RBCUA, BLOODU, SPECGRAV, [address]    Radiology:  XR KNEE RIGHT (3 VIEWS)   Final Result   NO BONY ABNORMALITY. LARGE EFFUSION. Chanel Infante Assessment/Plan:    Active Hospital Problems    Diagnosis Date Noted    Edema [R60.9] 07/12/2022     Priority: Medium    Effusion, right knee [M25.461] 07/12/2022     Priority: Medium         DVT Prophylaxis:   Diet: ADULT DIET;  Regular  Code Status: Full Code    PT/OT Eval Status:     Dispo -    R lower leg/knee edema, knee effusion- post aspiration, appreciate ortho recommendations- treatment for gout initiated, continue with lasix as well  Obesity with BMI 42%- supportive care  HIV positive- continue with Bictarvy -following by ID service as outpatient  Medically stable for acute admission at [facility], may needs placement at ID since remained weak, complains on pain          Electronically signed by Morgan Ibarra MD on 7/14/2022 at 7:50 AM

## 2022-07-14 NOTE — PROGRESS NOTES
Physical Therapy  Facility/Department: [facility] MED SURG UNIT  Daily Treatment Note  NAME: Juan Bangura  : 1975  MRN: [id number]    Date of Service: 2022    Discharge Recommendations:  Subacute/Skilled Nursing Facility,Home with Home health PT        Patient Diagnosis(es): The primary encounter diagnosis was Effusion of right knee. A diagnosis of Peripheral edema was also pertinent to this visit. Assessment   Assessment: (P) Pt. tolerated well with getting in and out of bed with supervision/ stand by assistance for set up with pillow and walker management. Pt. able to demo safe sit to stand with use of FWW. Pt. slow and steady. Assistance needed with donning/ doffing R knee immobilizer out of bed for precautions. Pt. ambulated 2 trials with increasing distance with FWW CGA/ SBA. Pt. demo's step to gait with flat feet with initial contact. VC's used to encourage heel strike to toe off as tolerated. Pt. slow and steady with demonstrating fatigue and mild SOB. Pt. Tolerated staying on feet > 15 minutes. Pt. education and participation with LE anti-embolic ther ex for circulation up to every hour. CP to L knee post intervention to reduce onset soreness. Pt. assisted to bed with LE's elevated. Activity Tolerance: Patient tolerated treatment well     Plan    Plan  Plan: 5-7 times per week  Plan weeks: Recommend SHELLY vs home with [facility]  Current Treatment Recommendations: Strengthening;ROM;Balance training;Functional mobility training;Transfer training;Gait training; Endurance training;Manual Therapy - Soft Tissue Mobilization;Pain management; Safety education & training;Home exercise program;Modalities  Plan Comment: CP     Restrictions  Restrictions/Precautions  Restrictions/Precautions: Weight Bearing (WBAT)  Required Braces or Orthoses?: Yes (Right knee immobilizer when OOB)  Lower Extremity Weight Bearing Restrictions  Right Lower Extremity Weight Bearing: Weight Bearing As Tolerated (No Pivoting on R LE with transfers)  Left Lower Extremity Weight Bearing: Weight Bearing As Tolerated     Subjective    Subjective  Subjective: (P) Pt. in bed, reports R knee and B feet hurt 5/10 with edema/swelling. Pt. satisfied to participate with therapy. Pain: 1/10      Objective   Vitals   Bed Mobility:  Supine <-> sit: supervision/ stand by assistance with set up. Transfer Training  Transfer Training: Yes  Sit to Stand: (P) Stand-by assistance/ set up. Stand to Sit: (P) Stand-by assistance/ supervision  Stand Pivot Transfers: Supervision  Bed to Chair: Supervision  Gait Training  Gait Training: (P) Yes  Right Side Weight Bearing: (P) As tolerated (R knee immobilizer donned OOB. No R LE pivot precaution. )  Gait  Overall Level of Assistance: (P) Stand-by assistance;Contact-guard assistance  Interventions: (P) Verbal cues (VC's to encourage towards heel strinke to toe off as ashtyn. )  Base of Support: (P) Widened  Speed/Faith: (P) Slow  Step Length: (P) Left shortened;Right shortened  Swing Pattern: (P) Left asymmetrical  Stance: (P) Right decreased  Gait Abnormalities: (P)  (3 point step to,leading with R LE. Flatfoot initial contact.)  Distance (ft): (P) 50 Feet (x 2 )  Assistive Device: (P) Brace/splint; Walker     PT Exercises  AROM Exercises: (P) Supine DF/PF x 20', phalangie flexion/extension x 20', Quad sets x 20', Gluts x 20'              Goals  Short Term Goals  Time Frame for Short term goals: 1-4 days of MS  Short term goal 1: Supervision with bed mobility  Short term goal 2: CGA with transfers  Short term goal 3: Pt able to ambulate with AD with CGA for 75'x 2 so that pt can ambulate throughout him home safely  Short term goal 4: Pt able to be on his feet for 5 min or > when standing at the sink to wash up  Short term goal 5: Pt indep with HEP for B LEs to improve pts mobility  Long Term Goals  Time Frame for Long term goals : Same as STGS  Patient Goals   Patient goals :  To be able to return home safely    Education       Therapy Time   Individual Concurrent Group Co-treatment   Time In 155         Time Out 8[facility]         Minutes Ekaterina Duckworth [address] [place] [address] .[phone]

## 2022-07-14 NOTE — PROGRESS NOTES
Davidson, at [facility], returned my call. She will begin precert. Patient was offered freedom of choice, and [facility] does accept his insurance. Awaiting approval.    [facility] called back and stated with his insurance and his medication cost. They could not accept him. 2pm  Patient aware of denial from [facility] choice [facility], because they do not accept his insurance. I then made him ware of denial from [facility]. I called Alejo Bailey at [facility] and they accept his insurance and will be starting precert.

## 2022-07-15 VITALS
RESPIRATION RATE: 20 BRPM | WEIGHT: 254 LBS | HEIGHT: 65 IN | OXYGEN SATURATION: 97 % | SYSTOLIC BLOOD PRESSURE: 127 MMHG | HEART RATE: 57 BPM | TEMPERATURE: 97.3 F | DIASTOLIC BLOOD PRESSURE: 68 MMHG | BODY MASS INDEX: 42.32 KG/M2

## 2022-07-15 PROCEDURE — 97530 THERAPEUTIC ACTIVITIES: CPT

## 2022-07-15 PROCEDURE — G0378 HOSPITAL OBSERVATION PER HR: HCPCS

## 2022-07-15 PROCEDURE — 97116 GAIT TRAINING THERAPY: CPT

## 2022-07-15 PROCEDURE — 97535 SELF CARE MNGMENT TRAINING: CPT

## 2022-07-15 PROCEDURE — 6370000000 HC RX 637 (ALT 250 FOR IP): Performed by: INTERNAL MEDICINE

## 2022-07-15 PROCEDURE — 6370000000 HC RX 637 (ALT 250 FOR IP): Performed by: ORTHOPAEDIC SURGERY

## 2022-07-15 RX ORDER — FUROSEMIDE 20 MG/1
20 TABLET ORAL DAILY
Qty: 30 TABLET | Refills: 0 | Status: SHIPPED | OUTPATIENT
Start: 2022-07-15

## 2022-07-15 RX ORDER — COLCHICINE 0.6 MG/1
0.6 TABLET ORAL DAILY
Qty: 30 TABLET | Refills: 0 | Status: SHIPPED | OUTPATIENT
Start: 2022-07-15

## 2022-07-15 RX ORDER — PREDNISONE 20 MG/1
20 TABLET ORAL DAILY
Qty: 5 TABLET | Refills: 0 | Status: SHIPPED | OUTPATIENT
Start: 2022-07-15 | End: 2022-07-20

## 2022-07-15 RX ADMIN — GABAPENTIN 100 MG: 100 CAPSULE ORAL at 08:10

## 2022-07-15 RX ADMIN — CITALOPRAM HYDROBROMIDE 10 MG: 20 TABLET ORAL at 08:10

## 2022-07-15 RX ADMIN — COLCHICINE 0.6 MG: 0.6 TABLET ORAL at 08:10

## 2022-07-15 RX ADMIN — FUROSEMIDE 20 MG: 20 TABLET ORAL at 08:10

## 2022-07-15 RX ADMIN — PREDNISONE 20 MG: 20 TABLET ORAL at 08:10

## 2022-07-15 RX ADMIN — MELOXICAM 7.5 MG: 7.5 TABLET ORAL at 08:11

## 2022-07-15 NOTE — PROGRESS NOTES
Facility/Department: [facility] OBS  Daily Treatment Note  NAME: Ria Cardenas  : 1975  MRN: [id number]    Date of Service: 7/15/2022    Discharge Recommendations:  [address], Home with Home health PT, Other (comment) (TBD)        Patient Diagnosis(es): The primary encounter diagnosis was Effusion of right knee. Diagnoses of Peripheral edema and Effusion, right knee were also pertinent to this visit. Assessment   Assessment: Pt. possibly going home later today. Feeling much better with pain and improved mobility overall. ABle to trail Rollator this date as pt. was using x ~1 week with knee pain and foot pain prior to hospitalization. Initial step to pattern noted with inc UE WB. Able to progress step lenght dec WB on arms and angel with pain inc to 4-5/10 in B feet. Distance and tolerance improved. Pain post 10. Educated donning and doffing Immobilizer. CP to R knee post session to dec pain and assist with muscle recovery. Activity Tolerance: Patient tolerated treatment well     Plan    Plan  Plan: 5-7 times per week  Plan weeks: Recommend SHELLY vs home with [address] PT  Current Treatment Recommendations: Strengthening;ROM;Balance training;Functional mobility training;Transfer training;Gait training; Endurance training;Manual Therapy - Soft Tissue Mobilization;Pain management; Safety education & training;Home exercise program;Modalities  Plan Comment: CP     Restrictions  Restrictions/Precautions  Restrictions/Precautions: Weight Bearing (WBAT)  Required Braces or Orthoses?: Yes (Right knee immobilizer when OOB)  Lower Extremity Weight Bearing Restrictions  Right Lower Extremity Weight Bearing: Weight Bearing As Tolerated (No Pivoting on R LE with transfers)  Left Lower Extremity Weight Bearing: Weight Bearing As Tolerated  Required Braces or Orthoses  Right Lower Extremity Brace: Knee Brace     Subjective    Subjective  Subjective: Pt. states he is doing well excited of progress.   Pt. reports alittle sore in knee but thats it. Pt. possibly to be able to go home today. Pain: 1/10 R knee     Objective   Vitals     Bed Mobility Training  Bed Mobility Training: Yes  Overall Level of Assistance: Independent  Balance  Sitting: Intact  Standing: With support  Transfer Training  Transfer Training: Yes  Overall Level of Assistance: Supervision  Sit to Stand: Supervision (VC for brake management on Rollator)  Stand to Sit: Supervision  Gait Training  Gait Training: Yes  Right Side Weight Bearing: As tolerated (Immobilizer when OOB)  Left Side Weight Bearing: As tolerated  Gait  Overall Level of Assistance: Stand-by assistance;Supervision  Interventions: Visual cues; Verbal cues  Base of Support: Widened  Speed/Angel: Slow  Step Length: Other (comment) (Initally step to pattern with inc BUE WB on Rollator and slow pacing then with cues able to relax shoulders and demo step through pattern as gait progressed beame more fluid and comfot as well as improved angel. Pain B feet 4-5/10 in WB and amb)  Distance (ft):  (200')  Assistive Device: Brace/splint; Other (comment) (Rollaotr and RLE immobilizer)                   Goals  Short Term Goals  Time Frame for Short term goals: 1-4 days of MS  Short term goal 1: Supervision with bed mobility  Short term goal 2: CGA with transfers  Short term goal 3: Pt able to ambulate with AD with CGA for 75'x 2 so that pt can ambulate throughout him home safely  Short term goal 4: Pt able to be on his feet for 5 min or > when standing at the sink to wash up  Short term goal 5: Pt indep with HEP for B LEs to improve pts mobility  Long Term Goals  Time Frame for Long term goals : Same as STGS  Patient Goals   Patient goals :  To be able to return home safely    Education  Patient Education  Education Given To: Patient  Education Provided:  (donning/doffing immobilizer)    Therapy Time   Individual Concurrent Group Co-treatment   Time In 0845         Time Out 0930 Minutes [address], [place]           Physical [facility]

## 2022-07-15 NOTE — PROGRESS NOTES
Discharge instructions reviewed with patient. T aware of when to take medication, what they are for and where to  prescriptions. IV dc'es without any bleeding. Helping to pack up patients belongings and will await pt's dad to transport home.

## 2022-07-15 NOTE — DISCHARGE INSTR - COC
Continuity of Care Form    Patient Name: Flor Lawrence   :  1975  MRN:  [id number]    Admit date:  2022  Discharge date:  ***    Code Status Order: Full Code   Advance Directives:     Admitting Physician:  Simeon Martinez MD  PCP: Syed Dumont    Discharging Nurse: [facility] Unit/Room#: 0208/0208-01  Discharging Unit Phone Number: ***    Emergency Contact:   Extended Emergency Contact Information  Primary Emergency Contact: Lore Back  Mobile Phone: [phone]  Relation: Parent  Preferred language: English    Past Surgical History:  History reviewed. No pertinent surgical history. Immunization History: There is no immunization history on file for this patient. Active Problems:  Patient Active Problem List   Diagnosis Code    Edema R60.9    Effusion, right knee M25.461       Isolation/Infection:   Isolation            No Isolation          Patient Infection Status       None to display            Nurse Assessment:  Last Vital Signs: /68   Pulse 57   Temp 97.3 °F (36.3 °C) (Oral)   Resp 20   Ht 5' 5\" (1.651 m)   Wt 254 lb (115.2 kg)   SpO2 97%   BMI 42.27 kg/m²     Last documented pain score (0-10 scale): Pain Level: 1  Last Weight:   Wt Readings from Last 1 Encounters:   22 254 lb (115.2 kg)     Mental Status:  {IP PT MENTAL STATUS:16139}    IV Access:  { LUDIN IV ACCESS:275488224}    Nursing Mobility/ADLs:  Walking   {P DME ERVI:860667654}  Transfer  {P DME WGFH:732235982}  Bathing  {P DME JSJF:550243831}  Dressing  {P DME HTEA:119775913}  Toileting  {P DME LKKJ:740430059}  Feeding  {P DME FWUP:030318544}  Med Admin  {[facility] DME HLRS:652708136}  Med Delivery   { LUDIN MED Delivery:364855487}    Wound Care Documentation and Therapy:        Elimination:  Continence:    Bowel: {YES / KA:49458}  Bladder: {YES / FM:73588}  Urinary Catheter: {Urinary Catheter:153299270}   Colostomy/Ileostomy/Ileal Conduit: {YES / TX:37524}       Date of Last BM: certify the above information and transfer of Ben Grady  is necessary for the continuing treatment of the diagnosis listed and that he requires [address] for greater 30 days.      Update Admission H&P: No change in H&P    PHYSICIAN SIGNATURE:  Electronically signed by Alyson Frederick MD on 7/15/22 at 7:45 AM EDT

## 2022-07-15 NOTE — PROGRESS NOTES
Occupational Therapy  Facility/Department: [facility] MED SURG UNIT  Daily Treatment Note  NAME: Kriss Calderon  : 1975  MRN: [id number]    Date of Service: 7/15/2022    Discharge Recommendations:  Home with Home health OT         Patient Diagnosis(es): The primary encounter diagnosis was Effusion of right knee. Diagnoses of Peripheral edema and Effusion, right knee were also pertinent to this visit. Assessment    Assessment: Pt seen in room to address LB dressing toielt transfer trials and partial co tx. with PT to complete car transfer. Instruction in positioning techniques and AE for LB dressing. MIN A required to don immobilizer in long sitting. Instruction in functional mobility and functional transfer with ww/ w/ F carryover demonstrated. Partial co tx. to complete car transfer. MOD A x 2 required w/ mod cues for technique  Activity Tolerance: Patient tolerated treatment well  Discharge Recommendations: Home with [address]  Times per Week: 3-7x/wk  Times per Day: Daily  Plan Weeks: 4 weeks  Current Treatment Recommendations: Strengthening;ROM;Balance training;Functional mobility training; Endurance training;Neuromuscular re-education; Safety education & training;Patient/Caregiver education & training;Self-Care / ADL     Restrictions       Subjective   Subjective  Subjective: \"I am going to do my bathing when I get home. \"  Pain: 1/10 R knee           Objective    Vitals     Bed Mobility Training  Bed Mobility Training: Yes  Overall Level of Assistance: Independent  Balance  Sitting: Without support  Standing: With support  Transfer Training  Transfer Training: Yes  Overall Level of Assistance: Supervision  Sit to Stand: Supervision  Stand to Sit: Supervision  Stand Pivot Transfers: Supervision  Car Transfer: Moderate assistance;Minimum assistance (Min assistance to moderate assist needed to complete with boost after L LE demonstrating decrease quad control to ascend into trunk.  2 person assistance used for safety to reduce risk of incident. Educated pt. proper technique out of truck.)  Gait Training  Gait Training: Yes  Right Side Weight Bearing: As tolerated (Immobilizer when OOB)  Left Side Weight Bearing: As tolerated  Gait  Overall Level of Assistance: Stand-by assistance  Interventions: Tactile cues; Verbal cues  Base of Support: Widened  Speed/Angel: Slow  Step Length: Other (comment) (Initally step to pattern with inc BUE WB on Rollator and slow pacing then with cues able to relax shoulders and demo step through pattern as gait progressed beame more fluid and comfot as well as improved angel. Pain B feet 4-5/10 in WB and amb)  Distance (ft):  (200')  Assistive Device: Brace/splint; Other (comment) (Rollaotr and RLE immobilizer)     ADL  LE Dressing: Minimal assistance  LE Dressing Skilled Clinical Factors: MIN A required to don immobilizer w/ increased time required to complete  Toileting: Stand by assistance  Toileting Skilled Clinical Factors: SBA required to complete              Patient Education  Education Given To: Patient  Education Provided: ADL Adaptive Strategies;Transfer Training;IADL Safety  Education Method: Verbal;Demonstration  Barriers to Learning: None  Education Outcome: Verbalized understanding;Demonstrated understanding    Goals  Short Term Goals  Short Term Goal 1: Superv bathing/dressing routine with good safety  Short Term Goal 2: superv toileting with good balance  Short Term Goal 3: mod I BUE HEP for improved strength and activity tolerance for self care routine  Short Term Goal 4: superv stand x 7 minutes to compelte self care routine  Short Term Goal 5: superv item retrieval and transport for self care routine       Therapy Time   Individual Concurrent Group Co-treatment   Time In 1240         Time Out 1315         Minutes 5978 [facility]

## 2022-07-15 NOTE — PROGRESS NOTES
Chart reviewed. Patient was admitted to [facility] under observation status with a diagnosis of edema. Patient's care discussed in daily quality rounds. Plan was for patient to go to [facility] for inpatient skilled therapies pending pre-cert. However, [facility] House supervisor reports that patient has changed his mind and now desires to [place] home with father and Loretta Fernandes to follow. This  met with patient whom confirms plan to DC home with father and [facility]. Freedom of choice provided. [facility] identified as agency of choice. This  contacted [facility] with new referral.  [facility] to follow patient upon DC. Patient identifies no further help at home or DC needs at this time. Patient reports that father plans to transport patient home via private car after lunch today. LUDIN completed. SS to continue to follow as needed while patient is at [facility].

## 2022-07-15 NOTE — PROGRESS NOTES
Physical Therapy  Facility/Department: [facility] MED SURG UNIT  Daily Treatment Note  NAME: Jocelyn Rasmussen  : 1975  MRN: [id number]    Date of Service: 7/15/2022    Discharge Recommendations:  (P) [address], Home with Home health PT, Other (comment)        Patient Diagnosis(es): The primary encounter diagnosis was Effusion of right knee. Diagnoses of Peripheral edema and Effusion, right knee were also pertinent to this visit. Assessment   Assessment: (P) Pt. tolerated good with partial Co-treat to assist pt. into truck with R LE immobilizer donned. Pt. required min assistance to moderate assist to complete boost into truck ascending step up leading with L LE. Pt. demonstrated decrease quad control with L LE requiring increase assistance to complete safely. Activity Tolerance: (P) Patient tolerated treatment well     Plan    Plan  Plan: (P) 5-7 times per week  Plan weeks: (P) Recommend SHELLY vs home with [address] PT  Current Treatment Recommendations: (P) Strengthening;ROM;Balance training;Functional mobility training;Transfer training;Gait training; Endurance training;Manual Therapy - Soft Tissue Mobilization;Pain management; Safety education & training;Home exercise program;Modalities  Plan Comment: CP     Restrictions  Restrictions/Precautions  Restrictions/Precautions: Weight Bearing (WBAT)  Required Braces or Orthoses?: Yes (Right knee immobilizer when OOB)  Lower Extremity Weight Bearing Restrictions  Right Lower Extremity Weight Bearing: Weight Bearing As Tolerated (No Pivoting on R LE with transfers)  Left Lower Extremity Weight Bearing: Weight Bearing As Tolerated  Required Braces or Orthoses  Right Lower Extremity Brace: Knee Brace     Subjective    Subjective  Subjective: (P) Pt. cooperative and ready for tsf into car to go home.   Pain: 1/10 R knee     Objective   Vitals       Balance  Sitting: Intact  Standing: With support  Transfer Training  Transfer Training: (P) Yes  Overall Level of Assistance: Supervision  Sit to Stand: (P) Supervision  Stand to Sit: (P) Supervision  Stand Pivot Transfers: (P) Supervision  Car Transfer: (P) Moderate assistance;Minimum assistance (Min assistance to moderate assist needed to complete with boost after L LE demonstrating decrease quad control to ascend into trunk. 2 person assistance used for safety to reduce risk of incident. Educated pt. proper technique out of truck.)  Gait Training  Gait Training: Yes  Right Side Weight Bearing: As tolerated (Immobilizer when OOB)  Left Side Weight Bearing: As tolerated  Gait  Overall Level of Assistance: Stand-by assistance;Supervision  Interventions: Visual cues; Verbal cues  Base of Support: Widened  Speed/Faith: Slow  Distance (ft):  20'x1, 5'x1  Assistive Device: Brace/splint; Other (comment) (Rollaotr and RLE immobilizer)                   Goals  Short Term Goals  Time Frame for Short term goals: 1-4 days of MS  Short term goal 1: Supervision with bed mobility  Short term goal 2: CGA with transfers  Short term goal 3: Pt able to ambulate with AD with CGA for 75'x 2 so that pt can ambulate throughout him home safely  Short term goal 4: Pt able to be on his feet for 5 min or > when standing at the sink to wash up  Short term goal 5: Pt indep with HEP for B LEs to improve pts mobility  Long Term Goals  Time Frame for Long term goals : Same as STGS  Patient Goals   Patient goals :  To be able to return home safely    Education       Therapy Time   Individual Concurrent Group Co-treatment   Time In 105         Time Out 125         Minutes [address] [place], [place] .[address]

## 2022-07-15 NOTE — DISCHARGE SUMMARY
Discharge Summary    Patient:  Fadi Rodriges  YOB: 1975    MRN: [id number]   Acct: [de-identified]    Primary Care Physician: Juju Stauffer    Admit date:  7/12/2022    Discharge date:   07/15/22      Discharge Diagnoses:   Edema  Principal Problem:    Edema  Active Problems:    Effusion, right knee  Resolved Problems:    * No resolved hospital problems. *      Admitted for: [facility] Course: patient was admitted with R knee non traumatic effusion, had arthrocentesis and 90 CC clear fluids were removed. Was evaluated by ortho service, was tretaed for presumable gout. CHF were ruled out. After completing his acute stay and treatment [facility] be [facility]     Consultants:  ortho    Discharge Medications:       Medication List        START taking these medications      colchicine 0.6 MG tablet  Commonly known as: COLCRYS  Take 1 tablet by mouth in the morning. furosemide 20 MG tablet  Commonly known as: LASIX  Take 1 tablet by mouth in the morning. meloxicam 7.5 MG tablet  Commonly known as: MOBIC  Take 1 tablet by mouth daily Regular daily schedule. predniSONE 20 MG tablet  Commonly known as: DELTASONE  Take 1 tablet by mouth in the morning for 5 days.             CONTINUE taking these medications      Biktarvy -25 MG Tabs per tablet  Generic drug: bictegravir-emtricitab-tenofovir alafenamide     citalopram 10 MG tablet  Commonly known as: CELEXA     LORazepam 0.5 MG tablet  Commonly known as: ATIVAN               Where to Get Your Medications        These medications were sent to [facility] [address] [facility]      Phone: [phone]   colchicine 0.6 MG tablet  furosemide 20 MG tablet  meloxicam 7.5 MG tablet  predniSONE 20 MG tablet         Physical Exam:    Vitals:  Vitals:    07/14/22 1840 07/14/22 1841 07/14/22 1926 07/15/22 0509   BP: (!) 141/74 127/73 127/61 127/68 Pulse: 96 88 84 57   Resp:   18 20   Temp: 97.4 °F (36.3 °C) 97.9 °F (36.6 °C) 97.9 °F (36.6 °C) 97.3 °F (36.3 °C)   TempSrc: Oral Oral Oral Oral   SpO2: 95% 95% 97% 97%   Weight:       Height:         Weight: Weight: 254 lb (115.2 kg)     24 hour intake/output:  Intake/Output Summary (Last 24 hours) at 7/15/2022 0745  Last data filed at 7/15/2022 0511  Gross per 24 hour   Intake 480 ml   Output 1650 ml   Net -1170 ml       General appearance - alert, well appearing, and in no distress  Chest - clear to auscultation, no wheezes, rales or rhonchi, symmetric air entry  Heart - normal rate, regular rhythm, normal S1, S2, no murmurs, rubs, clicks or gallops  Abdomen - soft, nontender, nondistended, no masses or organomegaly  Obese: Yes; Protuberant: Yes   Neurological - alert, oriented, normal speech, no focal findings or movement disorder noted  Extremities - peripheral pulses normal, no pedal edema, no clubbing or cyanosis  Skin - normal coloration and turgor, no rashes, no suspicious skin lesions noted    Procedures:        Diagnostic Test:      Radiology reports as per the Radiologist  Radiology: XR KNEE RIGHT (3 VIEWS)    Result Date: 7/12/2022  EXAMINATION:  RIGHT KNEE. CLINICAL HISTORY:  PAIN. COMPARISONS:  NONE AVAILABLE TECHNIQUE:  AP, lateral and oblique views. FINDINGS:  The bones are in normal alignment. No traumatic, arthritic or bony destructive changes demonstrated. There does appear to be a large effusion. NO BONY ABNORMALITY. LARGE EFFUSION.  .    Echo 2d w doppler w color w contrast    Result Date: 7/12/2022  Transthoracic Echocardiography Report (TTE)  Demographics   Patient Name    Justin Mdoi   Gender               Male   Patient Number  [id number]      Race                                                Ethnicity   Visit Number    [id number]   Room Number          6961   Corporate ID                Date of Study        07/12/2022   Accession       [id number]  Referring Physician  Number Date of Birth   1975  Sonographer          Ernestine BelleRICH   Age             49 year(s)  Interpreting         [facility]) Cardiology                              Physician            Delicia Finch  Procedure Type of Study   TTE procedure:ECHOCARDIOGRAM 2D DOPPLER W COLOR W CONTRAST. Procedure Date Date: 07/12/2022 Start: 03:07 PM Study Location: [facility] Technical Quality: Adequate visualization Indications:Edema. Patient Status: Routine Contrast Medium: Definity. Amount - 2 ml Height: 65 inches Weight: 254 pounds BSA: 2.19 m^2 BMI: 42.27 kg/m^2 BP: 121/89 mmHg  Conclusions   Summary  Technically difficult examination. Left ventricular ejection fraction is estimated at 60%. No hemodynamic evidence of significant valve disease   Signature   ----------------------------------------------------------------  Electronically signed by Scarlett Last(Interpreting physician)  on 07/12/2022 04:54 PM  ----------------------------------------------------------------   Findings  Left Ventricle Left ventricular ejection fraction is estimated at 60%. Right Ventricle Normal right ventricle structure and function. Normal right ventricle systolic pressure. Left Atrium Normal left atrium. Right Atrium Normal right atrium. Mitral Valve Structurally normal mitral valve. No evidence of mitral valve stenosis. No evidence of mitral regurgitaton. Tricuspid Valve Tricuspid valve is structurally normal. No evidence of tricuspid stenosis. No evidence of tricuspid regurgitation. Aortic Valve Structurally normal aortic valve. No evidence of aortic valve stenosis . No evidence of aortic valve regurgitation . Pulmonic Valve The pulmonic valve was not well visualized . Pericardial Effusion No evidence of significant pericardial effusion is noted. Aorta \ Miscellaneous The aorta is within normal limits.  M-Mode Measurements (cm)   LVIDd: 4.56 cm                         LVIDs: 2.99 cm  IVSd: 1 cm                             IVSs: 1.2 cm  LVPWd: 1.14 cm                         LVPWs: 1.2 cm  Rt. Vent.  Dimension: 1.17 cm           AO Root Dimension: 3.41 cm                                         ACS: 2.07 cm                                         LA: 4.2 cm                                         LVOT: 2.08 cm  Doppler Measurements:   AV Velocity:0.03 m/s                 MV Peak E-Wave: 0.7 m/s  AV Peak Gradient: 7.38 mmHg          MV Peak A-Wave: 0.67 m/s  AV Mean Gradient: 3.19 mmHg  AV Area (Continuity):3 cm^2          MV P1/2t: 50.7 msec                                       MVA by PHT4.34 cm^2  Valves  Mitral Valve   Peak E-Wave: 0.7 m/s                  Peak A-Wave: 0.67 m/s  P1/2t: 50.7 msec                      E/A Ratio: 1.03                                        Peak Gradient: 1.94 mmHg  Area (PHT): 4.34 cm^2                 Deceleration Time: 197.5 msec   Tissue Doppler   E' Septal Velocity: 0.09 m/s  E' Lateral Velocity: 0.14 m/s   Aortic Valve   Peak Velocity: 1.36 m/s             Mean Velocity: 0.81 m/s  Peak Gradient: 7.38 mmHg            Mean Gradient: 3.19 mmHg  Area (continuity): 3 cm^2  AV VTI: 21.95 cm   Cusp Separation: 2.07 cm   Pulmonic Valve   Peak Velocity: 1.01 m/s             Peak Gradient: 4.06 mmHg   LVOT   Peak Velocity: 0.99 m/s               Mean Velocity: 0.69 m/s  Peak Gradient: 3.82 mmHg              Mean Gradient: 2.2 mmHg  LVOT Diameter: 2.08 cm                LVOT VTI: 19.39 cm  Structures  Left Atrium   LA Dimension: 4.2 cm  LA/Aorta: 1.23   Left Ventricle   Diastolic Dimension: 6.52 cm         Systolic Dimension: 3.29 cm  Septum Diastolic: 1 cm               Septum Systolic: 1.2 cm  PW Diastolic: 4.26 cm                PW Systolic: 1.2 cm                                       FS: 34.4 %  LV EDV/LV EDV Index: 95.46 ml/44 m^2 LV ESV/LV ESV Index: 34.81 ml/16 m^2  EF Calculated: 63.5 %   LVOT Diameter: 2.08 cm   Right Ventricle   Diastolic Dimension: 1.17 cm  Aorta/ Miscellaneous Aorta   Aortic Root: 3.41 cm  LVOT Diameter: 2.08 cm         Results for orders placed or performed during the hospital encounter of 07/12/22   Comprehensive Metabolic Panel   Result Value Ref Range    Sodium 135 135 - 144 mEq/L    Potassium 4.0 3.4 - 4.9 mEq/L    Chloride 101 95 - 107 mEq/L    CO2 22 20 - 31 mEq/L    Anion Gap 12 9 - 15 mEq/L    Glucose 94 70 - 99 mg/dL    BUN 11 6 - 20 mg/dL    CREATININE 0.78 0.70 - 1.20 mg/dL    GFR Non-African American >60.0 >60    GFR  >60.0 >60    Calcium 9.4 8.5 - 9.9 mg/dL    Total Protein 9.4 (H) 6.3 - 8.0 g/dL    Albumin 3.2 (L) 3.5 - 4.6 g/dL    Total Bilirubin 0.6 0.2 - 0.7 mg/dL    Alkaline Phosphatase 133 (H) 35 - 104 U/L    ALT 91 (H) 0 - 41 U/L    AST 67 (H) 0 - 40 U/L    Globulin 6.2 (H) 2.3 - 3.5 g/dL   CBC with Auto Differential   Result Value Ref Range    WBC 6.4 4.2 - 9.0 K/uL    RBC 4.23 (L) 4.63 - 6.08 M/uL    Hemoglobin 13.0 (L) 13.7 - 17.5 g/dL    Hematocrit 40.3 (L) 42.0 - 52.0 %    MCV 95.3 (H) 79.0 - 92.2 fL    MCH 30.7 25.7 - 32.2 pg    MCHC 32.3 32.3 - 36.5 %    RDW 13.5 11.6 - 14.4 %    Platelets 281 802 - 301 K/uL    Neutrophils % 47.6 34.0 - 67.9 %    Immature Granulocytes % 0.2 %    Lymphocytes % 37.1 %    Monocytes % 8.4 5.3 - 12.2 %    Eosinophils % 6.1 0.8 - 7.0 %    Basophils % 0.6 0.2 - 1.2 %    Neutrophils Absolute 3.1 1.8 - 5.4 K/uL    Immature Granulocytes # 0.0 K/uL    Lymphocytes Absolute 2.4 1.3 - 3.6 K/uL    Monocytes Absolute 0.5 0.3 - 0.8 K/uL    Eosinophils Absolute 0.4 0.0 - 0.5 K/uL    Basophils Absolute 0.0 0.0 - 0.1 K/uL   Brain Natriuretic Peptide   Result Value Ref Range    Pro- pg/mL   Uric Acid   Result Value Ref Range    Uric Acid, Serum 6.6 3.4 - 7.0 mg/dL   TSH   Result Value Ref Range    TSH 1.910 0.440 - 3.860 uIU/mL   Basic Metabolic Panel w/ Reflex to MG   Result Value Ref Range    Sodium 138 135 - 144 mEq/L    Potassium reflex Magnesium 4.1 3.4 - 4.9 mEq/L Chloride 102 95 - 107 mEq/L    CO2 25 20 - 31 mEq/L    Anion Gap 11 9 - 15 mEq/L    Glucose 96 70 - 99 mg/dL    BUN 14 6 - 20 mg/dL    CREATININE 0.94 0.70 - 1.20 mg/dL    GFR Non-African American >60.0 >60    GFR  >60.0 >60    Calcium 9.2 8.5 - 9.9 mg/dL   CBC with Auto Differential   Result Value Ref Range    WBC 5.5 4.2 - 9.0 K/uL    RBC 3.98 (L) 4.63 - 6.08 M/uL    Hemoglobin 12.2 (L) 13.7 - 17.5 g/dL    Hematocrit 37.9 (L) 42.0 - 52.0 %    MCV 95.2 (H) 79.0 - 92.2 fL    MCH 30.7 25.7 - 32.2 pg    MCHC 32.2 (L) 32.3 - 36.5 %    RDW 13.4 11.6 - 14.4 %    Platelets 764 466 - 146 K/uL    Neutrophils % 48.7 34.0 - 67.9 %    Immature Granulocytes % 0.2 %    Lymphocytes % 31.6 %    Monocytes % 10.8 5.3 - 12.2 %    Eosinophils % 8.0 (H) 0.8 - 7.0 %    Basophils % 0.7 0.2 - 1.2 %    Neutrophils Absolute 2.7 1.8 - 5.4 K/uL    Immature Granulocytes # 0.0 K/uL    Lymphocytes Absolute 1.7 1.3 - 3.6 K/uL    Monocytes Absolute 0.6 0.3 - 0.8 K/uL    Eosinophils Absolute 0.4 0.0 - 0.5 K/uL    Basophils Absolute 0.0 0.0 - 0.1 K/uL   Brain Natriuretic Peptide   Result Value Ref Range    Pro- pg/mL   CBC with Auto Differential   Result Value Ref Range    WBC 7.0 4.2 - 9.0 K/uL    RBC 3.94 (L) 4.63 - 6.08 M/uL    Hemoglobin 12.0 (L) 13.7 - 17.5 g/dL    Hematocrit 37.2 (L) 42.0 - 52.0 %    MCV 94.4 (H) 79.0 - 92.2 fL    MCH 30.5 25.7 - 32.2 pg    MCHC 32.3 32.3 - 36.5 %    RDW 13.2 11.6 - 14.4 %    Platelets 686 984 - 422 K/uL    Neutrophils % 71.3 (H) 34.0 - 67.9 %    Immature Granulocytes % 0.4 %    Lymphocytes % 22.7 %    Monocytes % 5.4 5.3 - 12.2 %    Eosinophils % 0.1 (L) 0.8 - 7.0 %    Basophils % 0.1 (L) 0.2 - 1.2 %    Neutrophils Absolute 5.0 1.8 - 5.4 K/uL    Immature Granulocytes # 0.0 K/uL    Lymphocytes Absolute 1.6 1.3 - 3.6 K/uL    Monocytes Absolute 0.4 0.3 - 0.8 K/uL    Eosinophils Absolute 0.0 0.0 - 0.5 K/uL    Basophils Absolute 0.0 0.0 - 0.1 K/uL   Basic Metabolic Panel   Result Value Ref

## 2022-07-15 NOTE — PROGRESS NOTES
Patient in bed eating breakfast, states he slept well and is feeling better. Reviewed orders from ortho regarding the knee immobilizer and ace wrap support. Pt understands instructions and will be discharged today. He will call his father for a ride. Call light in reach.

## 2022-08-09 ENCOUNTER — OFFICE VISIT (OUTPATIENT)
Dept: ORTHOPEDIC SURGERY | Age: 47
End: 2022-08-09
Payer: COMMERCIAL

## 2022-08-09 VITALS
HEART RATE: 90 BPM | WEIGHT: 254 LBS | TEMPERATURE: 98.4 F | OXYGEN SATURATION: 95 % | BODY MASS INDEX: 42.32 KG/M2 | HEIGHT: 65 IN

## 2022-08-09 DIAGNOSIS — M10.9 GOUTY ARTHRITIS OF BOTH FEET: ICD-10-CM

## 2022-08-09 DIAGNOSIS — M25.471 ANKLE EDEMA, BILATERAL: Primary | ICD-10-CM

## 2022-08-09 DIAGNOSIS — M25.472 ANKLE EDEMA, BILATERAL: Primary | ICD-10-CM

## 2022-08-09 PROBLEM — M25.461 EFFUSION, RIGHT KNEE: Status: RESOLVED | Noted: 2022-07-12 | Resolved: 2022-08-09

## 2022-08-09 PROCEDURE — G8417 CALC BMI ABV UP PARAM F/U: HCPCS | Performed by: ORTHOPAEDIC SURGERY

## 2022-08-09 PROCEDURE — G8427 DOCREV CUR MEDS BY ELIG CLIN: HCPCS | Performed by: ORTHOPAEDIC SURGERY

## 2022-08-09 PROCEDURE — 1036F TOBACCO NON-USER: CPT | Performed by: ORTHOPAEDIC SURGERY

## 2022-08-09 PROCEDURE — 99213 OFFICE O/P EST LOW 20 MIN: CPT | Performed by: ORTHOPAEDIC SURGERY

## 2022-08-09 NOTE — PROGRESS NOTES
Subjective:      Patient ID: Matilde Nolasco is a 49 y.o. male who presents today for:  Chief Complaint   Patient presents with    New Patient     Patient presents with knee and ankle swelling. He states when he stands up it will start to swell. HPI:    The patient returns for follow-up of right knee effusion and was seen as inpatient consult for this purpose at [facility] approximately 4 weeks ago. He notes that his effusion is completely resolved and he was able to ambulate for short period of time without a walker. He has developed increased swelling of the ankles in the setting of gouty arthropathy and, as such, is utilizing a rolling walker for assistance routinely now. Remains on colchicine does note that he attempted to wean off colchicine and his symptoms recurred. He is also taking meloxicam on a daily schedule and inquiring about transitioning to as needed use of this medication. Denies any knee pain at this point. Does note ankle discomfort rated 3/10 with swelling over the dorsum of the left foot, no new trauma. Past Medical History:   Diagnosis Date    Anxiety     Gout     HIV (human immunodeficiency virus infection) ([facility] [address]      No past surgical history on file.   Social History     Socioeconomic History    Marital status: Single     Spouse name: Not on file    Number of children: Not on file    Years of education: Not on file    Highest education level: Not on file   Occupational History    Not on file   Tobacco Use    Smoking status: Never    Smokeless tobacco: Never   Substance and Sexual Activity    Alcohol use: Never    Drug use: Never    Sexual activity: Not Currently   Other Topics Concern    Not on file   Social History Narrative    Not on file     Social Determinants of Health     Financial Resource Strain: Not on file   Food Insecurity: Not on file   Transportation Needs: Not on file   Physical Activity: Not on file   Stress: Not on file   Social Connections: Not on file Intimate Partner Violence: Not on file   Housing Stability: Not on file     No family history on file. Allergies   Allergen Reactions    Bactrim [Sulfamethoxazole-Trimethoprim]     Dronabinol Rash     Current Outpatient Medications on File Prior to Visit   Medication Sig Dispense Refill    colchicine (COLCRYS) 0.6 MG tablet Take 1 tablet by mouth in the morning. 30 tablet 0    furosemide (LASIX) 20 MG tablet Take 1 tablet by mouth in the morning. 30 tablet 0    citalopram (CELEXA) 10 MG tablet Take 10 mg by mouth daily      LORazepam (ATIVAN) 0.5 MG tablet Take 0.5 mg by mouth every 6 hours as needed for Anxiety. bictegravir-emtricitab-tenofovir alafenamide (BIKTARVY) -25 MG TABS per tablet Take 1 tablet by mouth daily      meloxicam (MOBIC) 7.5 MG tablet Take 1 tablet by mouth daily Regular daily schedule. 30 tablet 5     No current facility-administered medications on file prior to visit. Acute and Chronic Pain Monitoring:   No flowsheet data found. Objective--Physical Examination:     Pulse 90   Temp 98.4 °F (36.9 °C) (Tympanic)   Ht 5' 5\" (1.651 m)   Wt 254 lb (115.2 kg)   SpO2 95%   BMI 42.27 kg/m²     Physical Examination:  Pleasant 54-year-old male in mild to minimal distress, alert and cooperative. Ambulates with a Rollator for assistance. Examination of the right lower extremity reveals no effusion of the knee on ballottement. He is able to range the knee from full extension to 120 degrees of flexion. Stable to varus and valgus stress, both in full extension and at 30 degrees of knee flexion. Normal posterior drawer test and Lachman's test, negative Nataliya test.  No synovitis. EHL, plantarflexion, dorsiflexion, knee flexion, knee extension 5/5. Trace to 1+ swelling over the dorsum of the foot as well as along the medial lateral aspect of the ankle without ankle effusion. No erythema. Sensory intact light touch in all dermatomes. DP and popliteal pulses are 2+. Scattered varicosities involving right leg    Examination of the left lower extremity reveals 1+ swelling over the dorsum of the foot, predominantly along the midfoot. No pain with hindfoot compression, midfoot translation, or forefoot compression at Lisfranc's joint. Sensory intact light touch in the deep peroneal, superficial peroneal, saphenous, tibial, sural nerve distributions. DP and popliteal pulses are 2+ with capillary refill less than 2 seconds. Ankle range of motion well-tolerated. Scattered varicosities involving the left leg. Radiographs and Laboratory Studies:     Diagnostic Imaging Studies:    No new radiographs obtained today. Prior plain film imaging of the right knee was reviewed from June 2022, archived through [facility]. Demonstrates maintenance of medial, lateral, patellofemoral articulations with large effusion noted at that time. Laboratory Studies:   Lab Results   Component Value Date    WBC 7.0 07/14/2022    HGB 12.0 (L) 07/14/2022    HCT 37.2 (L) 07/14/2022    MCV 94.4 (H) 07/14/2022     07/14/2022     No results found for: SEDRATE  No results found for: CRP    Assessment / Plan:      Diagnosis Orders   1. Ankle edema, bilateral  Compression Stockings MISC      2. Gouty arthritis of both feet           No orders of the defined types were placed in this encounter. Orders Placed This Encounter   Medications    Compression Stockings MISC     Sig: by Does not apply route Please dispense 2 pairs of graded compression stockings for lower extremities, 20-30 mmHg, knee-high     Dispense:  4 each     Refill:  0       -Interval resolution of right knee effusion and synovitis in the setting of acute gout flare: Patient will continue with colchicine for maintenance purposes. We will have him transition off meloxicam and may utilize intermittent Aleve as necessary to mitigate inflammation as it occurs. We discussed dosing parameters for that purpose.   May continue with rolling walker in the setting of bilateral ankle swelling.  -Bilateral foot and ankle swelling in setting of gouty arthropathy: I discussed conservative measures including intermittent use of anti-inflammatories, colchicine as outlined above. We have discussed use of graded compression stockings to mitigate edema and he was provided with a prescription for thigh-high compression stockings at 20 to 30 mmHg. Would have him wear these during the day but remove for sleep. May ice intermittently and maintain elevation to diminish swelling. He will follow-up with his primary care physician later this month and may receive further refills for colchicine from his PCP. Procedures Performed Today:     None    Follow-up (with Radiographs) / Referral:     The patient is welcome to follow-up as needed regarding right knee effusion.     Rigo Barker MD  Orthopaedic Surgery

## 2022-08-09 NOTE — PATIENT INSTRUCTIONS
Diagnosis:  -Resolution of right knee effusion  -Swelling of bilateral feet/ankles in setting of gouty arthropathy    Recommendations:  -Estella Scott out current supply of meloxicam then may transition to Aleve (naproxen) 1-2 tablets by mouth every 12 hours as needed to control inflammation/swelling.  -May take acetaminophen (Tylenol) 650 mg by mouth every 6 hours as needed to control pain. -May apply ice packs to both feet for 15-20 minutes every 4 hours as needed to control inflammation/swelling.  -Keep feet and ankles elevated above heart with 2-3 pillows when seated or lying down, to control swelling.  -Continue with use of rolling walker as necessary.  -You have been provided with a prescription for 2 pairs of knee-high compression stockings to control swelling of the feet and ankles. Recommend wearing these during the day and may remove for sleep. These may be obtained at [facility] or other local medical supply store. Plan for follow-up:  -You are welcome to follow-up with Dr. Ruddy Scott as needed regarding right knee swelling. [phone].